# Patient Record
Sex: MALE | Race: OTHER | NOT HISPANIC OR LATINO
[De-identification: names, ages, dates, MRNs, and addresses within clinical notes are randomized per-mention and may not be internally consistent; named-entity substitution may affect disease eponyms.]

---

## 2020-07-15 ENCOUNTER — APPOINTMENT (OUTPATIENT)
Dept: ENDOCRINOLOGY | Facility: CLINIC | Age: 55
End: 2020-07-15
Payer: MEDICAID

## 2020-07-15 ENCOUNTER — APPOINTMENT (OUTPATIENT)
Dept: HEART AND VASCULAR | Facility: CLINIC | Age: 55
End: 2020-07-15
Payer: MEDICAID

## 2020-07-15 ENCOUNTER — APPOINTMENT (OUTPATIENT)
Dept: INTERNAL MEDICINE | Facility: CLINIC | Age: 55
End: 2020-07-15
Payer: MEDICAID

## 2020-07-15 ENCOUNTER — LABORATORY RESULT (OUTPATIENT)
Age: 55
End: 2020-07-15

## 2020-07-15 ENCOUNTER — NON-APPOINTMENT (OUTPATIENT)
Age: 55
End: 2020-07-15

## 2020-07-15 VITALS
SYSTOLIC BLOOD PRESSURE: 135 MMHG | WEIGHT: 183 LBS | DIASTOLIC BLOOD PRESSURE: 86 MMHG | BODY MASS INDEX: 27.11 KG/M2 | HEIGHT: 69 IN | HEART RATE: 76 BPM

## 2020-07-15 VITALS
HEIGHT: 69 IN | TEMPERATURE: 98.3 F | BODY MASS INDEX: 27.11 KG/M2 | WEIGHT: 183 LBS | SYSTOLIC BLOOD PRESSURE: 125 MMHG | HEART RATE: 68 BPM | DIASTOLIC BLOOD PRESSURE: 82 MMHG | OXYGEN SATURATION: 98 %

## 2020-07-15 VITALS
BODY MASS INDEX: 27.7 KG/M2 | SYSTOLIC BLOOD PRESSURE: 118 MMHG | HEART RATE: 55 BPM | DIASTOLIC BLOOD PRESSURE: 66 MMHG | WEIGHT: 187 LBS | HEIGHT: 69 IN

## 2020-07-15 VITALS — TEMPERATURE: 98.7 F

## 2020-07-15 DIAGNOSIS — R63.0 ANOREXIA: ICD-10-CM

## 2020-07-15 DIAGNOSIS — Z11.3 ENCOUNTER FOR SCREENING FOR INFECTIONS WITH A PREDOMINANTLY SEXUAL MODE OF TRANSMISSION: ICD-10-CM

## 2020-07-15 DIAGNOSIS — Z78.9 OTHER SPECIFIED HEALTH STATUS: ICD-10-CM

## 2020-07-15 DIAGNOSIS — Z86.79 PERSONAL HISTORY OF OTHER DISEASES OF THE CIRCULATORY SYSTEM: ICD-10-CM

## 2020-07-15 DIAGNOSIS — Q80.9 CONGENITAL ICHTHYOSIS, UNSPECIFIED: ICD-10-CM

## 2020-07-15 DIAGNOSIS — R06.00 DYSPNEA, UNSPECIFIED: ICD-10-CM

## 2020-07-15 DIAGNOSIS — Z80.0 FAMILY HISTORY OF MALIGNANT NEOPLASM OF DIGESTIVE ORGANS: ICD-10-CM

## 2020-07-15 DIAGNOSIS — I25.10 ATHEROSCLEROTIC HEART DISEASE OF NATIVE CORONARY ARTERY W/OUT ANGINA PECTORIS: ICD-10-CM

## 2020-07-15 DIAGNOSIS — Z23 ENCOUNTER FOR IMMUNIZATION: ICD-10-CM

## 2020-07-15 PROCEDURE — 90732 PPSV23 VACC 2 YRS+ SUBQ/IM: CPT

## 2020-07-15 PROCEDURE — 36415 COLL VENOUS BLD VENIPUNCTURE: CPT

## 2020-07-15 PROCEDURE — 99204 OFFICE O/P NEW MOD 45 MIN: CPT | Mod: 25

## 2020-07-15 PROCEDURE — 99386 PREV VISIT NEW AGE 40-64: CPT | Mod: 25

## 2020-07-15 PROCEDURE — 99205 OFFICE O/P NEW HI 60 MIN: CPT | Mod: 25

## 2020-07-15 PROCEDURE — G0009: CPT

## 2020-07-15 PROCEDURE — 93000 ELECTROCARDIOGRAM COMPLETE: CPT

## 2020-07-15 RX ORDER — TRAZODONE HYDROCHLORIDE 100 MG/1
100 TABLET ORAL
Refills: 0 | Status: DISCONTINUED | COMMUNITY
End: 2020-07-15

## 2020-07-15 RX ORDER — TAMSULOSIN HYDROCHLORIDE 0.4 MG/1
0.4 CAPSULE ORAL
Refills: 0 | Status: DISCONTINUED | COMMUNITY
End: 2020-07-15

## 2020-07-15 RX ORDER — ASPIRIN 81 MG
81 TABLET, DELAYED RELEASE (ENTERIC COATED) ORAL
Refills: 0 | Status: ACTIVE | COMMUNITY

## 2020-07-15 RX ORDER — AMMONIUM LACTATE 12 %
12 CREAM (GRAM) TOPICAL TWICE DAILY
Qty: 1 | Refills: 3 | Status: ACTIVE | COMMUNITY
Start: 2020-07-15 | End: 1900-01-01

## 2020-07-15 NOTE — HEALTH RISK ASSESSMENT
[No falls in past year] : Patient reported no falls in the past year [No] : In the past 12 months have you used drugs other than those required for medical reasons? No [0] : 2) Feeling down, depressed, or hopeless: Not at all (0) [Employed] : employed [] :  [Fully functional (bathing, dressing, toileting, transferring, walking, feeding)] : Fully functional (bathing, dressing, toileting, transferring, walking, feeding) [Fully functional (using the telephone, shopping, preparing meals, housekeeping, doing laundry, using] : Fully functional and needs no help or supervision to perform IADLs (using the telephone, shopping, preparing meals, housekeeping, doing laundry, using transportation, managing medications and managing finances) [With Patient/Caregiver] : With Patient/Caregiver [] : No [[id number]] : 0 [Change in mental status noted] : No change in mental status noted [Language] : denies difficulty with language [Handling Complex Tasks] : denies difficulty handling complex tasks [AdvancecareDate] : 07/15/2020

## 2020-07-15 NOTE — REVIEW OF SYSTEMS
[Chest Pain] : chest pain [Fever] : no fever [Blurry Vision] : no blurred vision [Chills] : no chills [Mouth Sores] : no mouth sores [Shortness Of Breath] : no shortness of breath [Dyspnea on exertion] : not dyspnea during exertion [Lower Ext Edema] : no extremity edema [Abdominal Pain] : no abdominal pain [Cough] : no cough [Palpitations] : no palpitations [Heartburn] : no heartburn [Urinary Frequency] : no change in urinary frequency [Skin: A Rash] : no rash: [Joint Pain] : no joint pain [Dizziness] : no dizziness [Confusion] : no confusion was observed [Easy Bleeding] : no tendency for easy bleeding

## 2020-07-15 NOTE — HISTORY OF PRESENT ILLNESS
[FreeTextEntry1] : annual exam/est care. [de-identified] : decreased appetite\par - present for past few months\par - a/w globus senstation\par - has never had EGD\par \par Nephrolithiaiss\par - recurrent, in the past has been treated by urology w/ cystoscopy\par - most recently pain started 3 weeks ago, was seen at 2 different EDs\par - as per daughter CT scan showed small stones b/l\par - was d/c'ed after IV hydration w/ tamsulosin and outpt follow up\par \par dry skin\par - from work as \par - has tried numerous moisturizers and \par \par insomnia\par - normally goes to bed at 6-7pm wakes up at 5am\par - has trouble staying asleep\par - uses advil PM to sleep most nights\par \par DM2, uncontrolled\par -  today\par - was seen by endo\par - states compliance w/ metformin\par \par HCM\par - needs colonoscopy, states 2 brothers passed away from GI/colon cancers

## 2020-07-15 NOTE — PHYSICAL EXAM
[General Appearance - Well Developed] : well developed [General Appearance - Well Nourished] : well nourished [Normal Conjunctiva] : the conjunctiva exhibited no abnormalities [Normal Oropharynx] : normal oropharynx [Normal Jugular Venous V Waves Present] : normal jugular venous V waves present [Respiration, Rhythm And Depth] : normal respiratory rhythm and effort [Bowel Sounds] : normal bowel sounds [Abdomen Tenderness] : non-tender [Abdomen Soft] : soft [Abnormal Walk] : normal gait [Nail Clubbing] : no clubbing of the fingernails [] : no hepato-splenomegaly [Skin Color & Pigmentation] : normal skin color and pigmentation

## 2020-07-15 NOTE — REASON FOR VISIT
[Initial Evaluation] : an initial evaluation [DM Type 2] : DM Type 2 [Family Member] : family member

## 2020-07-15 NOTE — HISTORY OF PRESENT ILLNESS
[FreeTextEntry1] : Patient comes for evaluation of occasional CP, unrelated to exertion, short lasting resolved spontaneously,.\par He states that he had 2 MI in the past, none of them was w/u'ed and there was no hospitalization. \par Mild FANG, no SOB, no palpitations, no leg swelling.

## 2020-07-16 NOTE — PHYSICAL EXAM
[Alert] : alert [Normal Sclera/Conjunctiva] : normal sclera/conjunctiva [Normal Outer Ear/Nose] : the ears and nose were normal in appearance [No Neck Mass] : no neck mass was observed [Normal Rate] : heart rate was normal [Regular Rhythm] : with a regular rhythm [No Respiratory Distress] : no respiratory distress [Normal Bowel Sounds] : normal bowel sounds [No Edema] : no peripheral edema [Spine Straight] : spine straight [No Stigmata of Cushings Syndrome] : no stigmata of Cushings Syndrome [Normal Gait] : normal gait [No Rash] : no rash [Right Foot Was Examined] : right foot ~C was examined [Left Foot Was Examined] : left foot ~C was examined [Normal] : normal [Normal Reflexes] : deep tendon reflexes were 2+ and symmetric [Oriented x3] : oriented to person, place, and time [Vibration Dec.] : normal vibratory sensation at the level of the toes [FreeTextEntry2] : fungal nails [FreeTextEntry6] : fungal nails

## 2020-07-16 NOTE — REVIEW OF SYSTEMS
[Fatigue] : fatigue [Blurred Vision] : blurred vision [Nocturia] : nocturia [Recent Weight Loss (___ Lbs)] : recent weight loss: [unfilled] lbs [Negative] : Heme/Lymph [de-identified] : LE and UE stiffness and numbness

## 2020-07-16 NOTE — HISTORY OF PRESENT ILLNESS
[FreeTextEntry1] : 53 y/o M pt, with Hx of T2DM (dx ~10 years ago), presents today to establish endocrine care with me.  Pt is not aware of any  DM related complications,\par Other PMHx: Kidney stone. 2x Cardiac ischemia (many years ago) \par FHx: DM(father, 5 siblings), CA, CAD\par SHx: Non smoker. Occasional EtOH use. Works as a . \par Lifestyle: Physically active; walks everyday.  Eats 3 meals a day with snacks in between. Checks BS sporadically.\par Last Funduscopic visit: ~15 yrs ago\par Last RD visit: long time ago per pt. \par Has never followed with podiatrist. \par \par 07/15/2020\par Pt's daughter was concerned about his health and brought him to see an endocrinologist for his DM management. Pt is worried because his father had to undergo LE amputation due to DM complications. \par \par Pt presents today, feeling tired all the time with c/o of weight loss. He has reportedly lost at least 20 lbs over past 2 years and continues to lose weight. Pt reports of nocturia 4-5 times a day and blurry vision. He experiences LE and UE stiffness and numbness.  \par \par Current Medications: Metformin 750mg, Trazodone 100mg, Aspirin 81mg, Tamsulosin 0.4mg, Lancet

## 2020-07-16 NOTE — ASSESSMENT
[Diabetes Foot Care] : diabetes foot care [Importance of Diet and Exercise] : importance of diet and exercise to improve glycemic control, achieve weight loss and improve cardiovascular health [Self Monitoring of Blood Glucose] : self monitoring of blood glucose [FreeTextEntry1] : 53 y/o M pt with:\par \par 1. Hx of DM (dx ~10 yrs ago), poorly control. Patient is not aware of having diabetes complications:\par Assess for micro and macrovascular complications\par Diabetes treatment goals discussed, including target goals for BP, LDL-c, A1c, and body weight. \par Discussed the importance lifestyle diet modification and glucose monitoring, along with targets for pre and post prandial BS.  \par Briefly summarized anti-diabetic medications, including GIE3avo, SGLT2 benefits and side effects along with basal and prandial insulin \par Recommend pt continue present DM management until the assessment of current evaluation is completed. \par Refer pt to see RD and nurse educator for comprehensive DM teachings, including sick days management. \par Will order labs today, including metabolic, lipid profiles, folate and B12. \par \par Return in 1 week

## 2020-07-16 NOTE — ADDENDUM
[FreeTextEntry1] : I, Aliya Sullivan, acted solely as a scribe for Dr. Pardeep Conner on this date. 07/15/2020.

## 2020-07-16 NOTE — END OF VISIT
[FreeTextEntry3] : All medical record entries made by the Scribe were at my, Dr. Pardeep Conner, direction and personally dictated by me on 07/15/2020. I have reviewed the chart and agree that the record accurately reflects my personal performance of the history, physical exam, assessment and plan. I have also personally directed, reviewed and agreed with the chart.  [Time Spent: ___ minutes] : I have spent [unfilled] minutes of time on the encounter. [>50% of the face to face encounter time was spent on counseling and/or coordination of care for ___] : Greater than 50% of the face to face encounter time was spent on counseling and/or coordination of care for [unfilled]

## 2020-07-17 ENCOUNTER — APPOINTMENT (OUTPATIENT)
Dept: ENDOCRINOLOGY | Facility: CLINIC | Age: 55
End: 2020-07-17
Payer: MEDICAID

## 2020-07-17 PROCEDURE — 99214 OFFICE O/P EST MOD 30 MIN: CPT | Mod: 95

## 2020-07-17 RX ORDER — METFORMIN ER 750 MG 750 MG/1
750 TABLET ORAL
Refills: 0 | Status: DISCONTINUED | COMMUNITY
End: 2020-07-17

## 2020-07-18 LAB
ESTIMATED AVERAGE GLUCOSE: 309 MG/DL
HBA1C MFR BLD HPLC: 12.4 %

## 2020-07-18 NOTE — ASSESSMENT
[Importance of Diet and Exercise] : importance of diet and exercise to improve glycemic control, achieve weight loss and improve cardiovascular health [FreeTextEntry1] : 53 y/o M pt with:\par \par 1. Hx of T2DM, poorly controlled, with DM related complications of retinopathy,proteinuria, and ASCVD\par Will continue assessing for DM complications.\par Pt is on glucose toxicity with A1c of 12.4%. He declines initiation of insulin. Pt will follow low calorie diet, and initiate combined DM treatment, Janumet  BID. \par Advised pt to monitor pre and postprandial BS.\par Follow with RD next week.\par \par 2. Hx of Hyperlipidemia; recent  and LDL-c 165 on 7/15/20:\par For patients with DM and  ASCVD is at increase risk for cardiac events.\par Recommended to use high intensity statins therapy. In addition, moderate intensity statin therapy.\par Recommend lifestyle intervention such as weight loss, increased physical therapy, medical nutrition therapy which will allow patients to reduce ASCVD risk factors. \par Need for CAD stratifications.\par Will initiate pt on Atorvastatin 20 mg,and increase to 80 mg. \par Appt with RN\par Return in: 3 months.  [Self Monitoring of Blood Glucose] : self monitoring of blood glucose

## 2020-07-18 NOTE — HISTORY OF PRESENT ILLNESS
[Home] : at home, [unfilled] , at the time of the visit. [Medical Office: ([facility])___] : at the medical office located in  [Verbal consent obtained from patient] : the patient, [unfilled] [FreeTextEntry1] : 53 y/o M pt, with Hx of T2DM (dx ~10 years ago) with DM related complications of retinopathy, proteinuria, CAD\par Other PMHx: Kidney stone. 2x, "Cardiac ischemia" (many years ago) \par FHx: DM(father, 5 siblings), CA, CAD\par Lifestyle: Physically active; walks everyday. Eats 3 meals a day with snacks in between. Checks BS sporadically.\par Last Funduscopic visit: ~15 yrs ago\par Last RD visit: long time ago per pt. \par Has never followed with podiatrist. \par \par 07/15/2020\par Pt's daughter was concerned about his health and brought him to see an endocrinologist for his DM management. Pt is worried because his father had to undergo LE amputation due to DM complications. \par Pt presents today, feeling tired all the time with c/o of weight loss. He has reportedly lost at least 20 lbs over past 2 years and continues to lose weight. Pt reports of nocturia 4-5 times a day and blurry vision. He experiences LE and UE stiffness and numbness. \par \par 07/17/2020 \par Pt did not have any questions prior to the initiation of the telehealth visit.\par Pt presents today with his daughter via telehealth, clinically unchanged since his last visit. He continues to experience tiredness and excessive urination. He does not check BS. He has not modified his diet and continues to eat large food portions. \par He goes to work 5x7 days\par Current Medications: Metformin ER 750mg, Trazodone 100mg, Aspirin 81mg, Tamsulosin 0.4mg, Lancet \par \par Most recent lab studies:\par - 7/16/20: A1c 12.4%\par - 7/15/20: s.creat 0.69, , Cholesterol 238 (H), LDL-c 165 (H), TSH 1.07

## 2020-07-18 NOTE — REVIEW OF SYSTEMS
[Fatigue] : fatigue [Recent Weight Loss (___ Lbs)] : recent weight loss: [unfilled] lbs [Nocturia] : nocturia [Blurred Vision] : blurred vision [Negative] : Endocrine [de-identified] : LE and UE stiffness and numbness

## 2020-07-18 NOTE — ADDENDUM
[FreeTextEntry1] : I, Aliya Sullivan, acted solely as a scribe for Dr. Pardeep Conner on this date. 07/17/2020.

## 2020-07-18 NOTE — END OF VISIT
[FreeTextEntry3] : All medical record entries made by the Scribe were at my, Dr. Pardeep Conner, direction and personally dictated by me on 07/17/2020. I have reviewed the chart and agree that the record accurately reflects my personal performance of the history, physical exam, assessment and plan. I have also personally directed, reviewed and agreed with the chart.  [Time Spent: ___ minutes] : I have spent [unfilled] minutes of time on the encounter. [>50% of the face to face encounter time was spent on counseling and/or coordination of care for ___] : Greater than 50% of the face to face encounter time was spent on counseling and/or coordination of care for [unfilled]

## 2020-07-20 RX ORDER — SITAGLIPTIN AND METFORMIN HYDROCHLORIDE 50; 1000 MG/1; MG/1
50-1000 TABLET, FILM COATED ORAL
Qty: 90 | Refills: 3 | Status: DISCONTINUED | COMMUNITY
Start: 2020-07-17 | End: 2020-07-20

## 2020-07-22 DIAGNOSIS — Z11.59 ENCOUNTER FOR SCREENING FOR OTHER VIRAL DISEASES: ICD-10-CM

## 2020-07-22 LAB
25(OH)D3 SERPL-MCNC: 21 NG/ML
APPEARANCE: CLEAR
BILIRUBIN URINE: NEGATIVE
BLOOD URINE: NEGATIVE
C TRACH RRNA SPEC QL NAA+PROBE: NOT DETECTED
COLOR: NORMAL
FSH SERPL-MCNC: 4.8 IU/L
GLUCOSE QUALITATIVE U: ABNORMAL
HCV AB SER QL: NONREACTIVE
HCV S/CO RATIO: 0.13 S/CO
HIV1+2 AB SPEC QL IA.RAPID: NONREACTIVE
KETONES URINE: NEGATIVE
LEUKOCYTE ESTERASE URINE: NEGATIVE
LH SERPL-ACNC: 7.1 IU/L
MAGNESIUM SERPL-MCNC: 1.7 MG/DL
N GONORRHOEA RRNA SPEC QL NAA+PROBE: NOT DETECTED
NITRITE URINE: NEGATIVE
PH URINE: 5.5
PHOSPHATE SERPL-MCNC: 2.6 MG/DL
PROTEIN URINE: NEGATIVE
PSA SERPL-MCNC: 0.6 NG/ML
SOURCE AMPLIFICATION: NORMAL
SPECIFIC GRAVITY URINE: 1.04
T PALLIDUM AB SER QL IA: NEGATIVE
TESTOST SERPL-MCNC: 339 NG/DL
URATE SERPL-MCNC: 3.7 MG/DL
UROBILINOGEN URINE: NORMAL

## 2020-07-22 RX ORDER — ERGOCALCIFEROL 1.25 MG/1
1.25 MG CAPSULE, LIQUID FILLED ORAL
Qty: 12 | Refills: 3 | Status: ACTIVE | COMMUNITY
Start: 2020-07-22 | End: 1900-01-01

## 2020-07-24 ENCOUNTER — APPOINTMENT (OUTPATIENT)
Dept: ENDOCRINOLOGY | Facility: CLINIC | Age: 55
End: 2020-07-24
Payer: MEDICAID

## 2020-07-24 ENCOUNTER — RESULT CHARGE (OUTPATIENT)
Age: 55
End: 2020-07-24

## 2020-07-24 VITALS
HEART RATE: 93 BPM | DIASTOLIC BLOOD PRESSURE: 75 MMHG | WEIGHT: 182 LBS | SYSTOLIC BLOOD PRESSURE: 118 MMHG | BODY MASS INDEX: 26.88 KG/M2

## 2020-07-24 LAB — GLUCOSE BLDC GLUCOMTR-MCNC: 342

## 2020-07-24 PROCEDURE — 99211 OFF/OP EST MAY X REQ PHY/QHP: CPT | Mod: 25

## 2020-07-24 PROCEDURE — 97802 MEDICAL NUTRITION INDIV IN: CPT

## 2020-07-24 PROCEDURE — 82962 GLUCOSE BLOOD TEST: CPT

## 2020-08-03 LAB
ALBUMIN SERPL ELPH-MCNC: 4.7 G/DL
ALP BLD-CCNC: 74 U/L
ALT SERPL-CCNC: 49 U/L
ANION GAP SERPL CALC-SCNC: 15 MMOL/L
AST SERPL-CCNC: 26 U/L
BILIRUB SERPL-MCNC: 0.2 MG/DL
BUN SERPL-MCNC: 13 MG/DL
CALCIUM SERPL-MCNC: 9.6 MG/DL
CHLORIDE SERPL-SCNC: 98 MMOL/L
CHOLEST SERPL-MCNC: 238 MG/DL
CHOLEST/HDLC SERPL: 5.3 RATIO
CO2 SERPL-SCNC: 25 MMOL/L
CREAT SERPL-MCNC: 0.69 MG/DL
CREAT SPEC-SCNC: 57 MG/DL
FOLATE SERPL-MCNC: 17.1 NG/ML
GLUCOSE BLDC GLUCOMTR-MCNC: 333
GLUCOSE SERPL-MCNC: 374 MG/DL
HDLC SERPL-MCNC: 45 MG/DL
LDLC SERPL CALC-MCNC: 165 MG/DL
MICROALBUMIN 24H UR DL<=1MG/L-MCNC: <1.2 MG/DL
MICROALBUMIN/CREAT 24H UR-RTO: NORMAL MG/G
POTASSIUM SERPL-SCNC: 5.4 MMOL/L
PROT SERPL-MCNC: 7.2 G/DL
SARS-COV-2 IGG SERPL IA-ACNC: <0.1 INDEX
SARS-COV-2 IGG SERPL QL IA: NEGATIVE
SODIUM SERPL-SCNC: 138 MMOL/L
TRIGL SERPL-MCNC: 141 MG/DL
TSH SERPL-ACNC: 1.07 UIU/ML
VIT B12 SERPL-MCNC: 428 PG/ML

## 2020-08-10 ENCOUNTER — TRANSCRIPTION ENCOUNTER (OUTPATIENT)
Age: 55
End: 2020-08-10

## 2020-08-11 ENCOUNTER — NON-APPOINTMENT (OUTPATIENT)
Age: 55
End: 2020-08-11

## 2020-08-11 ENCOUNTER — APPOINTMENT (OUTPATIENT)
Age: 55
End: 2020-08-11
Payer: MEDICAID

## 2020-08-11 ENCOUNTER — APPOINTMENT (OUTPATIENT)
Dept: ENDOCRINOLOGY | Facility: CLINIC | Age: 55
End: 2020-08-11
Payer: MEDICAID

## 2020-08-11 VITALS
HEART RATE: 84 BPM | OXYGEN SATURATION: 97 % | RESPIRATION RATE: 14 BRPM | DIASTOLIC BLOOD PRESSURE: 82 MMHG | BODY MASS INDEX: 28.88 KG/M2 | WEIGHT: 195 LBS | SYSTOLIC BLOOD PRESSURE: 122 MMHG | HEIGHT: 69 IN

## 2020-08-11 VITALS
SYSTOLIC BLOOD PRESSURE: 113 MMHG | HEART RATE: 80 BPM | BODY MASS INDEX: 26.58 KG/M2 | DIASTOLIC BLOOD PRESSURE: 75 MMHG | WEIGHT: 180 LBS

## 2020-08-11 DIAGNOSIS — Z12.11 ENCOUNTER FOR SCREENING FOR MALIGNANT NEOPLASM OF COLON: ICD-10-CM

## 2020-08-11 PROCEDURE — 99203 OFFICE O/P NEW LOW 30 MIN: CPT

## 2020-08-11 PROCEDURE — 92004 COMPRE OPH EXAM NEW PT 1/>: CPT

## 2020-08-11 PROCEDURE — 99214 OFFICE O/P EST MOD 30 MIN: CPT | Mod: 25

## 2020-08-11 PROCEDURE — 92134 CPTRZ OPH DX IMG PST SGM RTA: CPT

## 2020-08-11 PROCEDURE — 82962 GLUCOSE BLOOD TEST: CPT

## 2020-08-11 NOTE — REVIEW OF SYSTEMS
[Decreased Appetite] : decreased appetite [Fatigue] : fatigue [Depression] : depression [Stress] : stress [Negative] : Heme/Lymph

## 2020-08-12 PROBLEM — Z12.11 COLON CANCER SCREENING: Status: ACTIVE | Noted: 2020-08-12

## 2020-08-12 NOTE — ASSESSMENT
[FreeTextEntry1] : 55 Bulgarian speaking male with PMH on HTN, DM and HLP is here with his daughter for evaluation for screening colonoscopy. He never had a colonoscopy before. Reports normal BM, denies hematochezia/melena, abdominal pain. Weight is stable. FH of colon in FDR however all>50 years at diagnosis\par \par - Book for screening colonoscopy with prior COVID testing\par - risk of b/p/i discussed\par

## 2020-08-12 NOTE — HISTORY OF PRESENT ILLNESS
[de-identified] : 55 Faroese speaking male with PMH on HTN, DM and HLP is here with his daughter (who is also the HCP) for evaluation for screening colonoscopy. He never had a colonoscopy before. Reports normal BM, denies hematochezia/melena, abdominal pain. Weight is stable.\par \par FH: Colon Ca in two brothers (in 70s and 80s), sister (stomach ca at 60 yrs), sister (liver ca)\par SH: never smoker; occasional etoh\par \par

## 2020-08-12 NOTE — PHYSICAL EXAM
[General Appearance - In No Acute Distress] : in no acute distress [General Appearance - Alert] : alert [Sclera] : the sclera and conjunctiva were normal [PERRL With Normal Accommodation] : pupils were equal in size, round, and reactive to light [Hearing Threshold Finger Rub Not [place]] : hearing was normal [Neck Appearance] : the appearance of the neck was normal [Respiration, Rhythm And Depth] : normal respiratory rhythm and effort [Heart Rate And Rhythm] : heart rate was normal and rhythm regular [Exaggerated Use Of Accessory Muscles For Inspiration] : no accessory muscle use [Edema] : there was no peripheral edema [Heart Sounds] : normal S1 and S2 [No CVA Tenderness] : no ~M costovertebral angle tenderness [Abdomen Tenderness] : non-tender [Abdomen Soft] : soft [Musculoskeletal - Swelling] : no joint swelling seen [No Spinal Tenderness] : no spinal tenderness [Abnormal Walk] : normal gait [] : no rash [No Focal Deficits] : no focal deficits [Affect] : the affect was normal [Oriented To Time, Place, And Person] : oriented to person, place, and time [FreeTextEntry1] : centripetal obesity

## 2020-08-13 LAB — GLUCOSE BLDC GLUCOMTR-MCNC: 178

## 2020-08-13 NOTE — PHYSICAL EXAM
[Alert] : alert [Normal Outer Ear/Nose] : the ears and nose were normal in appearance [Normal Sclera/Conjunctiva] : normal sclera/conjunctiva [No Respiratory Distress] : no respiratory distress [No Neck Mass] : no neck mass was observed [No Edema] : no peripheral edema [Normal Rate] : heart rate was normal [Regular Rhythm] : with a regular rhythm [Normal Bowel Sounds] : normal bowel sounds [Spine Straight] : spine straight [No Stigmata of Cushings Syndrome] : no stigmata of Cushings Syndrome [Normal Gait] : normal gait [No Rash] : no rash [Right Foot Was Examined] : right foot ~C was examined [Left Foot Was Examined] : left foot ~C was examined [Normal] : normal [Normal Reflexes] : deep tendon reflexes were 2+ and symmetric [Oriented x3] : oriented to person, place, and time [FreeTextEntry2] : fungal nails [Vibration Dec.] : normal vibratory sensation at the level of the toes [FreeTextEntry6] : fungal nails

## 2020-08-13 NOTE — END OF VISIT
[FreeTextEntry3] : All medical record entries made by the Scribe were at my, Dr. Pardeep Conner, direction and personally dictated by me on 08/11/2020. I have reviewed the chart and agree that the record accurately reflects my personal performance of the history, physical exam, assessment and plan. I have also personally directed, reviewed and agreed with the chart.  [Time Spent: ___ minutes] : I have spent [unfilled] minutes of time on the encounter. [>50% of the face to face encounter time was spent on counseling and/or coordination of care for ___] : Greater than 50% of the face to face encounter time was spent on counseling and/or coordination of care for [unfilled]

## 2020-08-13 NOTE — ADDENDUM
[FreeTextEntry1] : I, Aliya Slulivan, acted solely as a scribe for Dr. Pardeep Conner on this date. 08/11/2020.

## 2020-08-13 NOTE — HISTORY OF PRESENT ILLNESS
[FreeTextEntry1] : 53 y/o M pt, with Hx of T2DM (dx ~10 years ago) with DM related complications of retinopathy, proteinuria and CAD.\par Other PMHx: Kidney stone. 2x, "Cardiac ischemia" (many years ago) \par FHx: DM(father, 5 siblings), CA, CAD\par Lifestyle: Physically active; walks everyday. Eats 3 meals a day with snacks in between. Checks BS sporadically.\par Last Funduscopic visit: recently; pt was informed of having DM retinopathy. \par Last RD visit: long time ago per pt. \par Has never followed with podiatrist. \par \par 07/15/2020\par Pt's daughter was concerned about his health and brought him to see an endocrinologist for his DM management. Pt is worried because his father had to undergo LE amputation due to DM complications. \par Pt presents today, feeling tired all the time with c/o of weight loss. He has reportedly lost at least 20 lbs over past 2 years and continues to lose weight. Pt reports of nocturia 4-5 times a day and blurry vision. He experiences LE and UE stiffness and numbness. \par \par 07/17/2020 \par Pt did not have any questions prior to the initiation of the telehealth visit.\par Pt presents today with his daughter via telehealth, clinically unchanged since his last visit. He continues to experience tiredness and excessive urination. He does not check BS. He has not modified his diet and continues to eat large food portions. \par He goes to work 5x7 days\par \par 08/11/2020 \par Pt presents today with his daughter with POCT 178 for DM f/u, feeling more tired than usual "probably due to heat", stressed and depressed. Pt reports decrease in appetite since few days ago. His BS is reportedly in 200s. He sleeps 5 hours a day. He saw ophthalmologist recently who informed him of having DM retinopathy. He is scheduled for colonoscopy in 7/2020. \par \par Current Medications: Al[place]-Metformin 12.5-1000mg, Metformin ER 750mg, Trazodone 100mg, Aspirin 81mg, Tamsulosin 0.4mg, Lancet, Atorvastatin 20mg (initiated last visit 7/2020)\par \par Most recent lab studies:\par - 7/16/20: A1c 12.4%\par - 7/15/20: s.creat 0.69, , Cholesterol 238 (H), LDL-c 165 (H), TSH 1.07

## 2020-08-13 NOTE — ASSESSMENT
[Carbohydrate Consistent Diet] : carbohydrate consistent diet [Importance of Diet and Exercise] : importance of diet and exercise to improve glycemic control, achieve weight loss and improve cardiovascular health [FreeTextEntry1] : 56 y/o M pt with:\par \par 1. Hx of T2DM, poorly controlled, with DM related complications of retinopathy, proteinuria, and ASCVD\par His daughter is collaborating on his DM treatment with him. As a result, his glycemic control has improved. \par Add Glimepiride 2mg to Alogliptin. \par \par 2. Hx of Hyperlipidemia; recent  and LDL-c 165 on 7/15/20:\par For patients with DM and ASCVD is at increase risk for cardiac events.\par Recommended to use high intensity statins therapy. In addition, moderate intensity statin therapy.\par Pt saw his cardiologist for ASCVD stratification. \par \par Return in: 3 months

## 2020-09-04 DIAGNOSIS — J20.9 ACUTE BRONCHITIS, UNSPECIFIED: ICD-10-CM

## 2020-10-09 ENCOUNTER — APPOINTMENT (OUTPATIENT)
Dept: NEPHROLOGY | Facility: CLINIC | Age: 55
End: 2020-10-09

## 2020-11-16 ENCOUNTER — APPOINTMENT (OUTPATIENT)
Dept: ENDOCRINOLOGY | Facility: CLINIC | Age: 55
End: 2020-11-16
Payer: MEDICAID

## 2020-11-16 ENCOUNTER — APPOINTMENT (OUTPATIENT)
Dept: ENDOCRINOLOGY | Facility: CLINIC | Age: 55
End: 2020-11-16
Payer: COMMERCIAL

## 2020-11-16 ENCOUNTER — APPOINTMENT (OUTPATIENT)
Dept: INTERNAL MEDICINE | Facility: CLINIC | Age: 55
End: 2020-11-16
Payer: MEDICAID

## 2020-11-16 ENCOUNTER — LABORATORY RESULT (OUTPATIENT)
Age: 55
End: 2020-11-16

## 2020-11-16 VITALS
HEIGHT: 69 IN | HEART RATE: 93 BPM | WEIGHT: 185 LBS | BODY MASS INDEX: 27.4 KG/M2 | TEMPERATURE: 98.8 F | OXYGEN SATURATION: 98 % | DIASTOLIC BLOOD PRESSURE: 80 MMHG | SYSTOLIC BLOOD PRESSURE: 121 MMHG

## 2020-11-16 VITALS
BODY MASS INDEX: 27.32 KG/M2 | DIASTOLIC BLOOD PRESSURE: 88 MMHG | HEART RATE: 93 BPM | SYSTOLIC BLOOD PRESSURE: 137 MMHG | WEIGHT: 185 LBS

## 2020-11-16 DIAGNOSIS — N52.9 MALE ERECTILE DYSFUNCTION, UNSPECIFIED: ICD-10-CM

## 2020-11-16 DIAGNOSIS — Z23 ENCOUNTER FOR IMMUNIZATION: ICD-10-CM

## 2020-11-16 DIAGNOSIS — R30.0 DYSURIA: ICD-10-CM

## 2020-11-16 PROCEDURE — 99215 OFFICE O/P EST HI 40 MIN: CPT | Mod: 25

## 2020-11-16 PROCEDURE — 99072 ADDL SUPL MATRL&STAF TM PHE: CPT

## 2020-11-16 PROCEDURE — 90686 IIV4 VACC NO PRSV 0.5 ML IM: CPT

## 2020-11-16 PROCEDURE — 82962 GLUCOSE BLOOD TEST: CPT

## 2020-11-16 PROCEDURE — G0008: CPT

## 2020-11-16 PROCEDURE — 99213 OFFICE O/P EST LOW 20 MIN: CPT | Mod: 25

## 2020-11-16 PROCEDURE — 36415 COLL VENOUS BLD VENIPUNCTURE: CPT

## 2020-11-16 PROCEDURE — 97803 MED NUTRITION INDIV SUBSEQ: CPT

## 2020-11-16 RX ORDER — SILDENAFIL 100 MG/1
100 TABLET, FILM COATED ORAL
Qty: 10 | Refills: 11 | Status: ACTIVE | COMMUNITY
Start: 2020-11-16 | End: 1900-01-01

## 2020-11-16 NOTE — ASSESSMENT
[Carbohydrate Consistent Diet] : carbohydrate consistent diet [Importance of Diet and Exercise] : importance of diet and exercise to improve glycemic control, achieve weight loss and improve cardiovascular health [Self Monitoring of Blood Glucose] : self monitoring of blood glucose [FreeTextEntry1] : 56 y/o M pt with:\par \par 1. Hx of T2DM, poorly controlled, with DM related complications of retinopathy, proteinuria, and ASCVD\par At his initial visit A1c 12.4%, and today POCT: A1c is 8%. \par Diabetes treatment goals discussed to pt and his daughter. Pt is requesting for refills for 3 months since he is going away and he will call us later with a list of medications he is on. \par \par 2. Hx of Cardiac Ischemia (as per pt):\par He saw the cardiologist in 7/2020, work up in progress. \par \par 3. Hyperlipidemia. Explained the need to restrict fat consumptions and the importance of being consistent with his stains. Set LDL-c less than 50. Recommend f/u with cardiologist. \par \par Return in: 3 months

## 2020-11-16 NOTE — HISTORY OF PRESENT ILLNESS
[FreeTextEntry1] : follow up [de-identified] : doing well over all\par - would like to go to DR for at least 3 months\par \par DM2\par - compliant w/ medications\par \par ED\par - has tried viagra in the past w/ good results\par - would like refill\par \par dysuria \par - for the past 2 weeks c/o burning while urinating\par \par HCM\par - needs flu vax

## 2020-11-16 NOTE — ADDENDUM
[FreeTextEntry1] : I, [place]ber [place], acted solely as a scribe for Dr. Pardeep Conner on this date. 11/16/2020.

## 2020-11-16 NOTE — END OF VISIT
[FreeTextEntry3] : All medical record entries made by the Scribe were at my, Dr. Pardeep Conner, direction and personally dictated by me on 11/16/2020. I have reviewed the chart and agree that the record accurately reflects my personal performance of the history, physical exam, assessment and plan. I have also personally directed, reviewed and agreed with the chart.  [Time Spent: ___ minutes] : I have spent [unfilled] minutes of time on the encounter. [>50% of the face to face encounter time was spent on counseling and/or coordination of care for ___] : Greater than 50% of the face to face encounter time was spent on counseling and/or coordination of care for [unfilled]

## 2020-11-16 NOTE — REVIEW OF SYSTEMS
[Stress] : stress [Negative] : Neurological [Recent Weight Gain (___ Lbs)] : recent weight gain: [unfilled] lbs [As Noted in HPI] : as noted in HPI

## 2020-11-16 NOTE — PHYSICAL EXAM
[Alert] : alert [Normal Sclera/Conjunctiva] : normal sclera/conjunctiva [Normal Outer Ear/Nose] : the ears and nose were normal in appearance [No Neck Mass] : no neck mass was observed [No Respiratory Distress] : no respiratory distress [Normal Rate] : heart rate was normal [Regular Rhythm] : with a regular rhythm [No Edema] : no peripheral edema [Normal Bowel Sounds] : normal bowel sounds [Spine Straight] : spine straight [No Stigmata of Cushings Syndrome] : no stigmata of Cushings Syndrome [Normal Gait] : normal gait [No Rash] : no rash [Right Foot Was Examined] : right foot ~C was examined [Left Foot Was Examined] : left foot ~C was examined [Normal] : normal [Normal Reflexes] : deep tendon reflexes were 2+ and symmetric [Oriented x3] : oriented to person, place, and time [Vibration Dec.] : normal vibratory sensation at the level of the toes [FreeTextEntry2] : fungal nails [FreeTextEntry6] : fungal nails

## 2020-11-16 NOTE — HISTORY OF PRESENT ILLNESS
[FreeTextEntry1] : 53 y/o M pt, /88, BMI 27.32,with Hx of T2DM (dx ~10 years ago) with DM related complications of retinopathy, proteinuria and CAD.\par Other PMHx: Kidney stone. 2x, "Cardiac ischemia" (many years ago) \par FHx: DM(father, 5 siblings), CA, CAD\par Lifestyle: Physically active; walks everyday. Eats 3 meals a day with snacks in between. Checks BS sporadically.\par Last Funduscopic visit: 9/2020 \par Last RD visit: long time ago per pt. \par Has never followed with podiatrist. \par \par 07/15/2020\par Pt's daughter was concerned about his health and brought him to see an endocrinologist for his DM management. Pt is worried because his father had to undergo LE amputation due to DM complications. \par Pt presents today, feeling tired all the time with c/o of weight loss. He has reportedly lost at least 20 lbs over past 2 years and continues to lose weight. Pt reports of nocturia 4-5 times a day and blurry vision. He experiences LE and UE stiffness and numbness. \par \par 07/17/2020 \par Pt did not have any questions prior to the initiation of the telehealth visit.\par Pt presents today with his daughter via telehealth, clinically unchanged since his last visit. He continues to experience tiredness and excessive urination. He does not check BS. He has not modified his diet and continues to eat large food portions. \par He goes to work 5x7 days\par \par 08/11/2020 \par Pt presents today with his daughter with POCT 178 for DM f/u, feeling more tired than usual "probably due to heat", stressed and depressed. Pt reports decrease in appetite since few days ago. His BS is reportedly in 200s. He sleeps 5 hours a day. He saw ophthalmologist recently who informed him of having DM retinopathy. He is scheduled for colonoscopy in 7/2020. \par \par 11/16/20\par Today pt presents for DM f/u with POCT 109, feeling well. He states he noticed that his symptoms has improved, he is less tired and more energized. However, pt states he is unable to gain weight. Pt notes he is watching his diet and eating scheduled meals. \par Pt notes BS readings of 200 and postprandial BS readings of 150. \par Pt gained 5lbs since 8/2020. \par \par Pt did not bring his medications. \par \par Current Medications: Glimepiride 2mg QD, Aloglipton- Metformin 12.5-1000mg, Trazodone 100mg, Aspirin 81mg, Tamsulosin 0.4mg, Lancet, Atorvastatin 20mg (initiated in 7/2020)\par \par Most recent lab studies:\par - 11/16/20 POCT A1c 8.0%\par - 7/16/20: A1c 12.4%\par - 7/15/20: s.creat 0.69, , Cholesterol 238 (H), LDL-c 165 (H), TSH 1.07

## 2020-11-18 ENCOUNTER — TRANSCRIPTION ENCOUNTER (OUTPATIENT)
Age: 55
End: 2020-11-18

## 2020-11-18 LAB
25(OH)D3 SERPL-MCNC: 36.6 NG/ML
ALBUMIN SERPL ELPH-MCNC: 4.5 G/DL
ALP BLD-CCNC: 60 U/L
ALT SERPL-CCNC: 29 U/L
ANION GAP SERPL CALC-SCNC: 13 MMOL/L
APPEARANCE: CLEAR
AST SERPL-CCNC: 21 U/L
BACTERIA UR CULT: NORMAL
BASOPHILS # BLD AUTO: 0.06 K/UL
BASOPHILS NFR BLD AUTO: 0.8 %
BILIRUB SERPL-MCNC: 0.2 MG/DL
BILIRUBIN URINE: NEGATIVE
BLOOD URINE: ABNORMAL
BUN SERPL-MCNC: 13 MG/DL
C TRACH RRNA SPEC QL NAA+PROBE: NOT DETECTED
CALCIUM SERPL-MCNC: 9.7 MG/DL
CHLORIDE SERPL-SCNC: 102 MMOL/L
CHOLEST SERPL-MCNC: 141 MG/DL
CO2 SERPL-SCNC: 24 MMOL/L
COLOR: YELLOW
CREAT SERPL-MCNC: 0.67 MG/DL
CREAT SPEC-SCNC: 154 MG/DL
EOSINOPHIL # BLD AUTO: 0.1 K/UL
EOSINOPHIL NFR BLD AUTO: 1.3 %
ESTIMATED AVERAGE GLUCOSE: 197 MG/DL
ESTRADIOL SERPL-MCNC: 21 PG/ML
FSH SERPL-MCNC: 3.6 IU/L
GLUCOSE QUALITATIVE U: NEGATIVE
GLUCOSE SERPL-MCNC: 100 MG/DL
HBA1C MFR BLD HPLC: 8.5 %
HCT VFR BLD CALC: 48.9 %
HDLC SERPL-MCNC: 44 MG/DL
HGB BLD-MCNC: 15.5 G/DL
IMM GRANULOCYTES NFR BLD AUTO: 0.4 %
KETONES URINE: NEGATIVE
LDLC SERPL CALC-MCNC: 79 MG/DL
LEUKOCYTE ESTERASE URINE: NEGATIVE
LH SERPL-ACNC: 7.2 IU/L
LYMPHOCYTES # BLD AUTO: 2.89 K/UL
LYMPHOCYTES NFR BLD AUTO: 38.3 %
MAN DIFF?: NORMAL
MCHC RBC-ENTMCNC: 30.9 PG
MCHC RBC-ENTMCNC: 31.7 GM/DL
MCV RBC AUTO: 97.4 FL
MICROALBUMIN 24H UR DL<=1MG/L-MCNC: 2.5 MG/DL
MICROALBUMIN/CREAT 24H UR-RTO: 16 MG/G
MONOCYTES # BLD AUTO: 0.84 K/UL
MONOCYTES NFR BLD AUTO: 11.1 %
N GONORRHOEA RRNA SPEC QL NAA+PROBE: NOT DETECTED
NEUTROPHILS # BLD AUTO: 3.62 K/UL
NEUTROPHILS NFR BLD AUTO: 48.1 %
NITRITE URINE: NEGATIVE
NONHDLC SERPL-MCNC: 98 MG/DL
PH URINE: 5.5
PLATELET # BLD AUTO: 179 K/UL
POTASSIUM SERPL-SCNC: 4.3 MMOL/L
PROLACTIN SERPL-MCNC: 3.8 NG/ML
PROT SERPL-MCNC: 7.2 G/DL
PROTEIN URINE: NEGATIVE
PSA SERPL-MCNC: 0.6 NG/ML
RBC # BLD: 5.02 M/UL
RBC # FLD: 13.4 %
SODIUM SERPL-SCNC: 139 MMOL/L
SOURCE AMPLIFICATION: NORMAL
SPECIFIC GRAVITY URINE: >=1.03
TESTOST SERPL-MCNC: 495 NG/DL
TRIGL SERPL-MCNC: 91 MG/DL
UROBILINOGEN URINE: NORMAL
WBC # FLD AUTO: 7.54 K/UL

## 2020-11-18 RX ORDER — AZITHROMYCIN 250 MG/1
250 TABLET, FILM COATED ORAL
Qty: 1 | Refills: 0 | Status: COMPLETED | COMMUNITY
Start: 2020-09-04 | End: 2020-11-18

## 2020-11-18 RX ORDER — PROMETHAZINE HYDROCHLORIDE 6.25 MG/5ML
6.25 SOLUTION ORAL EVERY 8 HOURS
Qty: 210 | Refills: 0 | Status: COMPLETED | COMMUNITY
Start: 2020-09-04 | End: 2020-11-18

## 2020-11-20 ENCOUNTER — APPOINTMENT (OUTPATIENT)
Dept: GASTROENTEROLOGY | Facility: CLINIC | Age: 55
End: 2020-11-20

## 2021-03-22 ENCOUNTER — APPOINTMENT (OUTPATIENT)
Dept: ENDOCRINOLOGY | Facility: CLINIC | Age: 56
End: 2021-03-22

## 2021-03-22 ENCOUNTER — APPOINTMENT (OUTPATIENT)
Dept: INTERNAL MEDICINE | Facility: CLINIC | Age: 56
End: 2021-03-22
Payer: MEDICAID

## 2021-04-15 ENCOUNTER — NON-APPOINTMENT (OUTPATIENT)
Age: 56
End: 2021-04-15

## 2021-04-15 ENCOUNTER — APPOINTMENT (OUTPATIENT)
Dept: ENDOCRINOLOGY | Facility: CLINIC | Age: 56
End: 2021-04-15
Payer: MEDICAID

## 2021-04-15 ENCOUNTER — APPOINTMENT (OUTPATIENT)
Dept: INTERNAL MEDICINE | Facility: CLINIC | Age: 56
End: 2021-04-15
Payer: MEDICAID

## 2021-04-15 VITALS
WEIGHT: 184 LBS | BODY MASS INDEX: 27.25 KG/M2 | HEART RATE: 84 BPM | DIASTOLIC BLOOD PRESSURE: 81 MMHG | HEIGHT: 69 IN | SYSTOLIC BLOOD PRESSURE: 140 MMHG

## 2021-04-15 VITALS
HEART RATE: 75 BPM | DIASTOLIC BLOOD PRESSURE: 91 MMHG | OXYGEN SATURATION: 98 % | BODY MASS INDEX: 27.62 KG/M2 | SYSTOLIC BLOOD PRESSURE: 138 MMHG | TEMPERATURE: 98.2 F | WEIGHT: 187 LBS

## 2021-04-15 DIAGNOSIS — R53.83 OTHER FATIGUE: ICD-10-CM

## 2021-04-15 DIAGNOSIS — E55.9 VITAMIN D DEFICIENCY, UNSPECIFIED: ICD-10-CM

## 2021-04-15 PROCEDURE — 99072 ADDL SUPL MATRL&STAF TM PHE: CPT

## 2021-04-15 PROCEDURE — 99214 OFFICE O/P EST MOD 30 MIN: CPT | Mod: 25

## 2021-04-15 PROCEDURE — 99213 OFFICE O/P EST LOW 20 MIN: CPT | Mod: 25

## 2021-04-15 PROCEDURE — 82962 GLUCOSE BLOOD TEST: CPT

## 2021-04-15 RX ORDER — GLIMEPIRIDE 2 MG/1
2 TABLET ORAL DAILY
Qty: 180 | Refills: 2 | Status: COMPLETED | COMMUNITY
Start: 2020-08-11 | End: 2021-04-15

## 2021-04-15 NOTE — HISTORY OF PRESENT ILLNESS
[FreeTextEntry1] : follow up [de-identified] : fatigue\par - T lvls borderline\par - a/w ED and decreased libido\par - h/o normal cardio evaluation this past year\par Androgen Deficiency Aging Male Questionaire\par \par   Y           1. Do you have a decrease in libido?  \par   Y           2. Do you have a lack of energy?\par   Y           3. Do you have a decrease in strength and/or endurance?\par   Y           4. Have you lost height?\par   Y           5. Do you notice a decrease in "enjoyment of life"?\par          N    6. Are you sad and/or grumpy?\par   Y           7. Are your erections less strong?\par   Y           8. Have you noticed a recent deterioration in your ability to play sports?\par   Y           9. Are you falling asleep after dinner?\par   Y           10. Has there been a recent deterioration in your work performance?\par \par \par DM2\par - was see by endo today\par - glimepiride d/c'ed \par \par right index finger injury\par - 1 week ago, accidentally cut small piece of distal tip off at work\par - was evaluated at [facility] and d/c'ed\par - has since healed well.

## 2021-04-23 LAB
25(OH)D3 SERPL-MCNC: 40 NG/ML
ALBUMIN SERPL ELPH-MCNC: 4.6 G/DL
ALP BLD-CCNC: 64 U/L
ALT SERPL-CCNC: 28 U/L
ANION GAP SERPL CALC-SCNC: 11 MMOL/L
AST SERPL-CCNC: 21 U/L
BASOPHILS # BLD AUTO: 0.06 K/UL
BASOPHILS NFR BLD AUTO: 0.8 %
BILIRUB SERPL-MCNC: 0.3 MG/DL
BUN SERPL-MCNC: 15 MG/DL
CALCIUM SERPL-MCNC: 9.8 MG/DL
CHLORIDE SERPL-SCNC: 102 MMOL/L
CHOLEST SERPL-MCNC: 138 MG/DL
CO2 SERPL-SCNC: 25 MMOL/L
CREAT SERPL-MCNC: 0.72 MG/DL
EOSINOPHIL # BLD AUTO: 0.07 K/UL
EOSINOPHIL NFR BLD AUTO: 0.9 %
ESTRADIOL SERPL-MCNC: 8 PG/ML
FSH SERPL-MCNC: 3.8 IU/L
GLUCOSE SERPL-MCNC: 180 MG/DL
HCT VFR BLD CALC: 48.2 %
HDLC SERPL-MCNC: 47 MG/DL
HGB BLD-MCNC: 15.6 G/DL
IMM GRANULOCYTES NFR BLD AUTO: 0.4 %
LDLC SERPL CALC-MCNC: 80 MG/DL
LH SERPL-ACNC: 5.4 IU/L
LYMPHOCYTES # BLD AUTO: 2.31 K/UL
LYMPHOCYTES NFR BLD AUTO: 30.6 %
MAN DIFF?: NORMAL
MCHC RBC-ENTMCNC: 31.8 PG
MCHC RBC-ENTMCNC: 32.4 GM/DL
MCV RBC AUTO: 98.2 FL
MONOCYTES # BLD AUTO: 0.88 K/UL
MONOCYTES NFR BLD AUTO: 11.7 %
NEUTROPHILS # BLD AUTO: 4.19 K/UL
NEUTROPHILS NFR BLD AUTO: 55.6 %
NONHDLC SERPL-MCNC: 92 MG/DL
PLATELET # BLD AUTO: 157 K/UL
POTASSIUM SERPL-SCNC: 4.7 MMOL/L
PROT SERPL-MCNC: 7.4 G/DL
RBC # BLD: 4.91 M/UL
RBC # FLD: 13.7 %
SODIUM SERPL-SCNC: 138 MMOL/L
TESTOST SERPL-MCNC: 361 NG/DL
TRIGL SERPL-MCNC: 60 MG/DL
TSH SERPL-ACNC: 1.03 UIU/ML
VIT B12 SERPL-MCNC: 270 PG/ML
WBC # FLD AUTO: 7.54 K/UL

## 2021-07-16 RX ORDER — ALOGLIPTIN AND METFORMIN HYDROCHLORIDE 12.5; 1 MG/1; MG/1
12.5-1 TABLET, FILM COATED ORAL
Qty: 180 | Refills: 2 | Status: DISCONTINUED | COMMUNITY
Start: 2020-07-20 | End: 2021-07-16

## 2021-07-16 NOTE — HISTORY OF PRESENT ILLNESS
[FreeTextEntry1] : 54 y/o M pt, with Hx of T2DM (dx ~10 years ago) with DM related complications of retinopathy, proteinuria and CAD.\par Other PMHx: Kidney stone. 2x, "Cardiac ischemia" (many years ago) \par FHx: DM(father, 5 siblings), CA, CAD\par Lifestyle: Walks everyday. Skips lunch often. Checks BS sporadically.\par Last Funduscopic visit: 9/2020 \par Last RD visit: long time ago per pt. \par Has never followed with podiatrist. \par \par 04/15/2021 \par Pt presents today with POCT 167, /81 and BMI 27.17 for DM f/u. He is feeling great today. Pt experienced an episode of dizziness a week ago at around 2 PM which lasted for 5 hours. Pt had not eaten his lunch that day and he did not check BS at the time. Denies CP, SOB, confusion or speech problems associated to that dizziness. \par Pt reports of FBS below 150 and 30% of BS readings at 6PM below 60. Of note, he skips lunch often. \par His weight has been stable for past 5 months.  \par \par Current Medications: Glimepiride 2 mg qd, Aloglipton- Metformin 12.5-1000 mg bid, Atorvastatin 20 mg (initiated in 7/2020), Flomax 0.4 mg, Vitamin D 50,000 IU q3w\par \par Labs:\par - 11/17/20: A1c 8.5%, s.creat 0.67, LDL-c 79, Vit D 25-OH 36.6, Prolactin 3.8 (L)\par - 07/16/20: A1c 12.4%\par - 07/15/20: s.creat 0.69, Cholesterol 238, LDL-c 165, , TSH 1.07

## 2021-07-16 NOTE — REVIEW OF SYSTEMS
[Negative] : Heme/Lymph [Recent Weight Gain (___ Lbs)] : no recent weight gain [Recent Weight Loss (___ Lbs)] : no recent weight loss [de-identified] : hypoglycemia

## 2021-07-16 NOTE — ADDENDUM
[FreeTextEntry1] : I, Aliya Sullivan, acted solely as a scribe for Dr. Pardeep Conner on this date. 04/15/2021. \par \par 7/16/2021 AL\par Insurance prefers [id number]. Spoke to daughter and she Ok'd change. Sent to pharmacy.

## 2021-07-16 NOTE — END OF VISIT
[Time Spent: ___ minutes] : I have spent [unfilled] minutes of time on the encounter. [FreeTextEntry3] : All medical record entries made by the Scribe were at my, Dr. Pardeep Conner, direction and personally dictated by me on 04/15/2021. I have reviewed the chart and agree that the record accurately reflects my personal performance of the history, physical exam, assessment and plan. I have also personally directed, reviewed and agreed with the chart.

## 2021-07-16 NOTE — ASSESSMENT
[Hypoglycemia Management] : hypoglycemia management [FreeTextEntry1] : 56 y/o M pt with:\par \par 1. Hx of T2DM, poorly controlled, with DM related complications of retinopathy, proteinuria, and ASCVD:\par At his initial visit, A1c was 12.4% ( July, 2020). Most recent A1c of 8.5% on 11/17/20. \par Pt is experiencing hypoglycemia because of skipping meals. His FBS has improved significantly. \par Recommend pt to continue with Alogliptin- Metformin and hold Glimepiride. \par \par 3. HLD\par Recent LDL-c 79. \par Recommend pt to to reduced saturated and trans fat consumption. Continue with moderate dose statin.  \par \par Return in: 6 months

## 2021-07-23 ENCOUNTER — APPOINTMENT (OUTPATIENT)
Dept: ENDOCRINOLOGY | Facility: CLINIC | Age: 56
End: 2021-07-23
Payer: MEDICAID

## 2021-07-23 VITALS
DIASTOLIC BLOOD PRESSURE: 80 MMHG | BODY MASS INDEX: 26.07 KG/M2 | HEART RATE: 94 BPM | SYSTOLIC BLOOD PRESSURE: 138 MMHG | WEIGHT: 176 LBS | HEIGHT: 69 IN

## 2021-07-23 LAB — GLUCOSE BLDC GLUCOMTR-MCNC: 296

## 2021-07-23 PROCEDURE — 99214 OFFICE O/P EST MOD 30 MIN: CPT | Mod: 25

## 2021-07-23 PROCEDURE — 82962 GLUCOSE BLOOD TEST: CPT

## 2021-07-23 NOTE — PHYSICAL EXAM
[Alert] : alert [Normal Sclera/Conjunctiva] : normal sclera/conjunctiva [Normal Outer Ear/Nose] : the ears and nose were normal in appearance [No Neck Mass] : no neck mass was observed [Clear to Auscultation] : lungs were clear to auscultation bilaterally [Normal Rate] : heart rate was normal [Abdominal Aorta Normal] : the abdominal aorta was normal [Soft] : abdomen soft [Kyphosis] : no kyphosis present [No Rash] : no rash [Foot Ulcers] : no foot ulcers [Normal Reflexes] : deep tendon reflexes were 2+ and symmetric [Oriented x3] : oriented to person, place, and time [de-identified] : Left shoulder, no redness mild tenderness to palpation

## 2021-07-23 NOTE — HISTORY OF PRESENT ILLNESS
[FreeTextEntry1] : 56 y/o M pt, with Hx of T2DM (dx ~10 years ago) with DM related complications of retinopathy, proteinuria and CAD.\par Other PMHx: Kidney stone. 2x, "Cardiac ischemia" (many years ago) \par FHx: DM(father, 5 siblings), CA, CAD\par Lifestyle: Walks everyday. Skips lunch often. Checks BS sporadically.\par Last Funduscopic visit: 9/2020 \par Last RD visit: long time ago per pt. \par Has never followed with podiatrist. \par \par 04/15/2021 \par Pt presents today with POCT 167, /81 and BMI 27.17 for DM f/u. He is feeling great today. Pt experienced an episode of dizziness a week ago at around 2 PM which lasted for 5 hours. Pt had not eaten his lunch that day and he did not check BS at the time. Denies CP, SOB, confusion or speech problems associated to that dizziness. \par Pt reports of FBS below 150 and 30% of BS readings at 6PM below 60. Of note, he skips lunch often\par \par 7/23/2021\par Patient received COVID-19 vaccine yesterday and he is complaining of left shoulder soreness.  Denies fever, headache\par Patient is not taking his medicine for few days because pharmacy did not give refills, as a result his blood sugars is in the mid 200 he denies osmotic diuresis symptoms though.\par \par Patient is state overall he has improved significantly his level of energy is better and glucose readings in the mornings are between 90 and 130 and postprandial at no higher than 160.\par \par Current Medications: Glimepiride 2 mg qd, Aloglipton- Metformin 12.5-1000 mg bid, Atorvastatin 20 mg (initiated in 7/2020), Flomax 0.4 mg, Vitamin D 50,000 IU q3w\par \par Labs:\par - 11/17/20: A1c 8.5%, s.creat 0.67, LDL-c 79, Vit D 25-OH 36.6, Prolactin 3.8 (L)\par - 07/16/20: A1c 12.4%\par - 07/15/20: s.creat 0.69, Cholesterol 238, LDL-c 165, , TSH 1.07

## 2021-07-23 NOTE — REVIEW OF SYSTEMS
[Polyuria] : no polyuria [Acanthosis] : no acanthosis  [Headaches] : no headaches [Tremors] : no tremors [Depression] : no depression [Negative] : Endocrine [FreeTextEntry9] : Left shoulder soreness, with limited range of motion for the past 24 hours

## 2021-07-23 NOTE — ASSESSMENT
[Hypoglycemia Management] : hypoglycemia management [FreeTextEntry1] : 56 y/o M pt with:\par \par 1. Hx of T2DM, poorly controlled, with DM related complications of retinopathy, proteinuria, and ASCVD:\par At his initial visit, A1c was 12.4% ( July, 2020). Most recent A1c of 8.5% on 11/17/20. \par Patient has made significant changes to his lifestyle and diet as a result of his blood sugar and pre and postprandial has significantly improved\par Over the past few days he is not taking his medicine because pharmacy did not refill it.\par Today POCT glucose 296\par I spoke with the pharmacist and patient will go later to  his prescriptions.\par Recommend pt to continue with Alogliptin- Metformin and hold Glimepiride. \par Labs today\par Return in 4 months\par . \par \par 3. HLD\par Recent LDL-c 79. \par Recommend pt to to reduced saturated and trans fat consumption. Continue with moderate dose statin atorvastatin 20 mg.  \par \par Return in: 4 months

## 2021-09-11 LAB
ALBUMIN SERPL ELPH-MCNC: 4.6 G/DL
ALP BLD-CCNC: 76 U/L
ALT SERPL-CCNC: 36 U/L
ANION GAP SERPL CALC-SCNC: 13 MMOL/L
AST SERPL-CCNC: 23 U/L
BILIRUB SERPL-MCNC: 0.3 MG/DL
BUN SERPL-MCNC: 14 MG/DL
CALCIUM SERPL-MCNC: 9.6 MG/DL
CHLORIDE SERPL-SCNC: 98 MMOL/L
CHOLEST SERPL-MCNC: 153 MG/DL
CO2 SERPL-SCNC: 25 MMOL/L
CREAT SERPL-MCNC: 0.77 MG/DL
CREAT SPEC-SCNC: 106 MG/DL
ESTIMATED AVERAGE GLUCOSE: 217 MG/DL
FOLATE SERPL-MCNC: 15.3 NG/ML
GLUCOSE BLDC GLUCOMTR-MCNC: 109
GLUCOSE BLDC GLUCOMTR-MCNC: 167
GLUCOSE BLDC GLUCOMTR-MCNC: 8
GLUCOSE SERPL-MCNC: 294 MG/DL
HBA1C MFR BLD HPLC: 9.2 %
HDLC SERPL-MCNC: 41 MG/DL
LDLC SERPL CALC-MCNC: 99 MG/DL
MICROALBUMIN 24H UR DL<=1MG/L-MCNC: <1.2 MG/DL
MICROALBUMIN/CREAT 24H UR-RTO: NORMAL MG/G
NONHDLC SERPL-MCNC: 113 MG/DL
POTASSIUM SERPL-SCNC: 4.8 MMOL/L
PROT SERPL-MCNC: 7.3 G/DL
SODIUM SERPL-SCNC: 136 MMOL/L
TRIGL SERPL-MCNC: 70 MG/DL
VIT B12 SERPL-MCNC: 321 PG/ML

## 2021-11-16 ENCOUNTER — APPOINTMENT (OUTPATIENT)
Dept: ENDOCRINOLOGY | Facility: CLINIC | Age: 56
End: 2021-11-16

## 2022-04-11 PROBLEM — Z11.59 SCREENING FOR VIRAL DISEASE: Status: RESOLVED | Noted: 2020-07-15 | Resolved: 2022-04-11

## 2022-10-03 ENCOUNTER — NON-APPOINTMENT (OUTPATIENT)
Age: 57
End: 2022-10-03

## 2022-10-27 ENCOUNTER — APPOINTMENT (OUTPATIENT)
Dept: ENDOCRINOLOGY | Facility: CLINIC | Age: 57
End: 2022-10-27

## 2022-10-27 VITALS
WEIGHT: 188 LBS | HEART RATE: 94 BPM | BODY MASS INDEX: 27.85 KG/M2 | DIASTOLIC BLOOD PRESSURE: 94 MMHG | SYSTOLIC BLOOD PRESSURE: 145 MMHG | HEIGHT: 69 IN

## 2022-10-27 DIAGNOSIS — Z87.442 PERSONAL HISTORY OF URINARY CALCULI: ICD-10-CM

## 2022-10-27 PROCEDURE — 36415 COLL VENOUS BLD VENIPUNCTURE: CPT

## 2022-10-27 PROCEDURE — 82962 GLUCOSE BLOOD TEST: CPT

## 2022-10-27 PROCEDURE — 99214 OFFICE O/P EST MOD 30 MIN: CPT | Mod: 25

## 2022-10-28 NOTE — PHYSICAL EXAM
[Alert] : alert [Normal Sclera/Conjunctiva] : normal sclera/conjunctiva [Normal Outer Ear/Nose] : the ears and nose were normal in appearance [No Neck Mass] : no neck mass was observed [Clear to Auscultation] : lungs were clear to auscultation bilaterally [Normal Rate] : heart rate was normal [Abdominal Aorta Normal] : the abdominal aorta was normal [Soft] : abdomen soft [No Rash] : no rash [Normal Reflexes] : deep tendon reflexes were 2+ and symmetric [Oriented x3] : oriented to person, place, and time [Kyphosis] : no kyphosis present [Foot Ulcers] : no foot ulcers [de-identified] : Left shoulder, no redness mild tenderness to palpation

## 2022-10-28 NOTE — HISTORY OF PRESENT ILLNESS
[Finger Stick] : Finger Stick: Yes [FreeTextEntry1] : 56 y/o M pt, with Hx of T2DM (dx ~2010) with DM related complications of retinopathy, proteinuria and CAD.\par Other PMHx: Kidney stone. 2x, "Cardiac ischemia" (many years ago) \par FHx: DM (father, 5 siblings), CA, CAD\par Lifestyle: Walks everyday. Skips lunch often. Checks BS sporadically.\par Vaccination: COVID\par Last Funduscopic visit: 2022\par Last RD visit: long time ago per pt. \par Has never followed with podiatrist. \par \par 10/27/2022\par Pt has POCT 131, /94 and BMI 27.76. He gained 12 lbs in 15 months. Pt lost his insurance, but has one now. \par Today, pt is feeling okay, with c/o feeling weak/dizzy in the summer. \par Saw ophthalmologist a few months ago. Will complete blood test with PCP next week as per pt. \par Denies weight loss, blurry vision, pins and needle sensation, and numbness. \par \par [Medications verified as per pt on 10/27/2022]  \par Current Medications: Glimepiride 2 mg qd (bottle is full of tablets), Metformin 1000 mg bid, Losartan 25 mg, Atorvastatin 20 mg (initiated in 7/2020), Flomax 0.4 mg, Vitamin D 50,000 IU q3w\par

## 2022-10-28 NOTE — DATA REVIEWED
[FreeTextEntry1] : Labs:\par - 11/17/20: A1c 8.5%, s.creat 0.67, LDL-c 79, Vit D 25-OH 36.6, Prolactin 3.8 (L)\par - 07/16/20: A1c 12.4%\par - 07/15/20: s.creat 0.69, Cholesterol 238, LDL-c 165, , TSH 1.07

## 2022-10-28 NOTE — REVIEW OF SYSTEMS
[Recent Weight Gain (___ Lbs)] : recent weight gain: [unfilled] lbs [As Noted in HPI] : as noted in HPI [Dizziness] : dizziness [Negative] : Heme/Lymph [Recent Weight Loss (___ Lbs)] : no recent weight loss [Blurred Vision] : no blurred vision [Acanthosis] : no acanthosis  [Headaches] : no headaches [Tremors] : no tremors [Pain/Numbness of Digits] : no pain/numbness of digits [FreeTextEntry2] : He gained 12 lbs in 15 months. [de-identified] : Denies pins and needle sensation.

## 2022-10-28 NOTE — END OF VISIT
[FreeTextEntry3] : All medical record entries made by the Scribe were at my, Dr. Pardeep Conner, direction and personally dictated by me on 10/27/2022. I have reviewed the chart and agree that the record accurately reflects my personal performance of the history, physical exam, assessment and plan. I have also personally directed, reviewed and agreed with the chart.  [Time Spent: ___ minutes] : I have spent [unfilled] minutes of time on the encounter.

## 2022-10-28 NOTE — ADDENDUM
[FreeTextEntry1] : I Rodgerkely Quezada act soley as a scribe for Dr. Pardeep Conner on this date. 10/27/2022

## 2022-12-06 ENCOUNTER — APPOINTMENT (OUTPATIENT)
Dept: INTERNAL MEDICINE | Facility: CLINIC | Age: 57
End: 2022-12-06

## 2022-12-06 VITALS
BODY MASS INDEX: 27.85 KG/M2 | OXYGEN SATURATION: 97 % | HEART RATE: 77 BPM | TEMPERATURE: 98.5 F | DIASTOLIC BLOOD PRESSURE: 77 MMHG | RESPIRATION RATE: 14 BRPM | WEIGHT: 188 LBS | SYSTOLIC BLOOD PRESSURE: 132 MMHG | HEIGHT: 69 IN

## 2022-12-06 DIAGNOSIS — Z00.00 ENCOUNTER FOR GENERAL ADULT MEDICAL EXAMINATION W/OUT ABNORMAL FINDINGS: ICD-10-CM

## 2022-12-06 DIAGNOSIS — M79.671 PAIN IN RIGHT FOOT: ICD-10-CM

## 2022-12-06 DIAGNOSIS — M79.2 NEURALGIA AND NEURITIS, UNSPECIFIED: ICD-10-CM

## 2022-12-06 DIAGNOSIS — Z23 ENCOUNTER FOR IMMUNIZATION: ICD-10-CM

## 2022-12-06 DIAGNOSIS — N40.1 BENIGN PROSTATIC HYPERPLASIA WITH LOWER URINARY TRACT SYMPMS: ICD-10-CM

## 2022-12-06 DIAGNOSIS — R35.1 BENIGN PROSTATIC HYPERPLASIA WITH LOWER URINARY TRACT SYMPMS: ICD-10-CM

## 2022-12-06 DIAGNOSIS — R51.9 HEADACHE, UNSPECIFIED: ICD-10-CM

## 2022-12-06 PROCEDURE — 90686 IIV4 VACC NO PRSV 0.5 ML IM: CPT

## 2022-12-06 PROCEDURE — G0008: CPT

## 2022-12-06 PROCEDURE — 99213 OFFICE O/P EST LOW 20 MIN: CPT | Mod: 25

## 2022-12-06 PROCEDURE — 99396 PREV VISIT EST AGE 40-64: CPT | Mod: 25

## 2022-12-06 RX ORDER — TAMSULOSIN HYDROCHLORIDE 0.4 MG/1
0.4 CAPSULE ORAL
Qty: 90 | Refills: 3 | Status: ACTIVE | COMMUNITY
Start: 1900-01-01 | End: 1900-01-01

## 2022-12-06 RX ORDER — OMEGA-3/DHA/EPA/FISH OIL 35-113.5MG
1000 TABLET,CHEWABLE ORAL DAILY
Qty: 30 | Refills: 3 | Status: ACTIVE | COMMUNITY
Start: 2021-04-16 | End: 1900-01-01

## 2022-12-06 RX ORDER — LINAGLIPTIN AND METFORMIN HYDROCHLORIDE 2.5; 1 MG/1; MG/1
2.5-1 TABLET, FILM COATED ORAL
Qty: 180 | Refills: 0 | Status: COMPLETED | COMMUNITY
Start: 2021-07-16 | End: 2022-12-06

## 2022-12-06 NOTE — HISTORY OF PRESENT ILLNESS
[FreeTextEntry1] : annual exam [de-identified] : Travel\par - leaving for [place] 12/20 returns in February 2023\par \par head injury while at work\par - tire fell on his head and he fell forward\par - felt dizzy and saw some stars but did not pass out\par - did not go for medical eval at the time.\par - c/o b/l frantal HA, throbbing, non radiating\par - no change in vision\par \par DM2, uncontrolled\par - confusion w/ medications\par - pt shows that he is still on kazano, but endo note only shows metformin\par \par right flank pain\par - h/o renal stone\par - was previously on tamsulosin but stopped\par - c/o continued nocturia 4x/night and urinary frequency\par \par right arm pain\par - radiates down arm to finger tips\par - present for 1 year, worse of the past 2-3 months\par - at times will cry from pain\par - never had eval. \par \par HCM\par - flu vax today\par - needs c scope; was evaluated by GI 2 yrs ago, never followed up ? b/c of pandemic\par - was evaluated by cardio 2 yrs ago, never followed up

## 2022-12-07 ENCOUNTER — TRANSCRIPTION ENCOUNTER (OUTPATIENT)
Age: 57
End: 2022-12-07

## 2022-12-07 LAB — PSA SERPL-MCNC: 0.82 NG/ML

## 2022-12-15 ENCOUNTER — TRANSCRIPTION ENCOUNTER (OUTPATIENT)
Age: 57
End: 2022-12-15

## 2022-12-15 ENCOUNTER — APPOINTMENT (OUTPATIENT)
Dept: MRI IMAGING | Facility: HOSPITAL | Age: 57
End: 2022-12-15

## 2022-12-15 ENCOUNTER — OUTPATIENT (OUTPATIENT)
Dept: OUTPATIENT SERVICES | Facility: HOSPITAL | Age: 57
LOS: 1 days | End: 2022-12-15
Payer: COMMERCIAL

## 2022-12-15 PROCEDURE — 70551 MRI BRAIN STEM W/O DYE: CPT | Mod: 26

## 2022-12-15 PROCEDURE — 70551 MRI BRAIN STEM W/O DYE: CPT

## 2022-12-15 PROCEDURE — 72040 X-RAY EXAM NECK SPINE 2-3 VW: CPT | Mod: 26

## 2022-12-15 PROCEDURE — 72040 X-RAY EXAM NECK SPINE 2-3 VW: CPT

## 2022-12-15 PROCEDURE — 72141 MRI NECK SPINE W/O DYE: CPT

## 2022-12-15 PROCEDURE — 72141 MRI NECK SPINE W/O DYE: CPT | Mod: 26

## 2022-12-19 ENCOUNTER — TRANSCRIPTION ENCOUNTER (OUTPATIENT)
Age: 57
End: 2022-12-19

## 2022-12-19 LAB
ALBUMIN SERPL ELPH-MCNC: 4.1 G/DL
ALP BLD-CCNC: 70 U/L
ALT SERPL-CCNC: 40 U/L
ANION GAP SERPL CALC-SCNC: 14 MMOL/L
AST SERPL-CCNC: 24 U/L
BILIRUB SERPL-MCNC: 0.2 MG/DL
BUN SERPL-MCNC: 19 MG/DL
CALCIUM SERPL-MCNC: 9.5 MG/DL
CHLORIDE SERPL-SCNC: 103 MMOL/L
CHOLEST SERPL-MCNC: 137 MG/DL
CO2 SERPL-SCNC: 22 MMOL/L
CREAT SERPL-MCNC: 0.87 MG/DL
CREAT SPEC-SCNC: 166 MG/DL
EGFR: 99 ML/MIN/1.73M2
ESTIMATED AVERAGE GLUCOSE: 217 MG/DL
FOLATE SERPL-MCNC: 8.8 NG/ML
GLUCOSE BLDC GLUCOMTR-MCNC: 131
GLUCOSE SERPL-MCNC: 155 MG/DL
HBA1C MFR BLD HPLC: 9.2 %
HDLC SERPL-MCNC: 38 MG/DL
LDLC SERPL CALC-MCNC: 55 MG/DL
MICROALBUMIN 24H UR DL<=1MG/L-MCNC: 1.6 MG/DL
MICROALBUMIN/CREAT 24H UR-RTO: 10 MG/G
NONHDLC SERPL-MCNC: 99 MG/DL
POTASSIUM SERPL-SCNC: 4.4 MMOL/L
PROT SERPL-MCNC: 6.9 G/DL
SODIUM SERPL-SCNC: 139 MMOL/L
TRIGL SERPL-MCNC: 222 MG/DL
TSH SERPL-ACNC: 0.72 UIU/ML
VIT B12 SERPL-MCNC: 403 PG/ML

## 2022-12-23 DIAGNOSIS — Q04.8 OTHER SPECIFIED CONGENITAL MALFORMATIONS OF BRAIN: ICD-10-CM

## 2022-12-23 DIAGNOSIS — M50.821 OTHER CERVICAL DISC DISORDERS AT C4-C5 LEVEL: ICD-10-CM

## 2022-12-23 DIAGNOSIS — M50.822 OTHER CERVICAL DISC DISORDERS AT C5-C6 LEVEL: ICD-10-CM

## 2022-12-23 DIAGNOSIS — M47.812 SPONDYLOSIS WITHOUT MYELOPATHY OR RADICULOPATHY, CERVICAL REGION: ICD-10-CM

## 2022-12-23 DIAGNOSIS — R90.82 WHITE MATTER DISEASE, UNSPECIFIED: ICD-10-CM

## 2022-12-23 DIAGNOSIS — G93.5 COMPRESSION OF BRAIN: ICD-10-CM

## 2022-12-23 DIAGNOSIS — R51.9 HEADACHE, UNSPECIFIED: ICD-10-CM

## 2022-12-23 DIAGNOSIS — J34.89 OTHER SPECIFIED DISORDERS OF NOSE AND NASAL SINUSES: ICD-10-CM

## 2022-12-23 DIAGNOSIS — M50.823: ICD-10-CM

## 2023-02-01 ENCOUNTER — APPOINTMENT (OUTPATIENT)
Dept: HEART AND VASCULAR | Facility: CLINIC | Age: 58
End: 2023-02-01
Payer: MEDICAID

## 2023-02-01 VITALS
HEIGHT: 69 IN | WEIGHT: 182 LBS | OXYGEN SATURATION: 95 % | DIASTOLIC BLOOD PRESSURE: 75 MMHG | HEART RATE: 76 BPM | SYSTOLIC BLOOD PRESSURE: 126 MMHG | BODY MASS INDEX: 26.96 KG/M2 | TEMPERATURE: 98.1 F

## 2023-02-01 DIAGNOSIS — R07.9 CHEST PAIN, UNSPECIFIED: ICD-10-CM

## 2023-02-01 PROCEDURE — 99214 OFFICE O/P EST MOD 30 MIN: CPT | Mod: 25

## 2023-02-01 PROCEDURE — 93000 ELECTROCARDIOGRAM COMPLETE: CPT

## 2023-02-01 NOTE — HISTORY OF PRESENT ILLNESS
[FreeTextEntry1] : Patient comes for f/u. Occasional CP, unrelated to exertion, short lasting resolved spontaneously,.\par He states that he had 2 MI in the past, none of them was w/u'ed and there was no hospitalization. \par Mild FANG, no SOB, no palpitations, no leg swelling.

## 2023-02-01 NOTE — DISCUSSION/SUMMARY
[FreeTextEntry1] : EKG: NSR 64/min\par \par \par 1. CAD/CP -plan for CCTA \par 2.  FANG - TTE\par 3. HLD - cont atorvastatin\par 3. DM - cont as per PCP/Endo -cont losartan 25mg po daily\par 4. 1 month\par \par

## 2023-02-01 NOTE — REVIEW OF SYSTEMS
[Dyspnea on exertion] : dyspnea during exertion [Chest Discomfort] : chest discomfort [Fever] : no fever [Blurry Vision] : no blurred vision [Earache] : no earache [SOB] : no shortness of breath [Leg Claudication] : no intermittent leg claudication [Palpitations] : no palpitations [Orthopnea] : no orthopnea [Syncope] : no syncope [Cough] : no cough [Abdominal Pain] : no abdominal pain [Urinary Frequency] : no change in urinary frequency [Joint Pain] : no joint pain [Rash] : no rash [Dizziness] : no dizziness [Confusion] : no confusion was observed [Easy Bleeding] : no tendency for easy bleeding

## 2023-02-01 NOTE — PHYSICAL EXAM
[General Appearance - Well Developed] : well developed [General Appearance - Well Nourished] : well nourished [Normal Conjunctiva] : the conjunctiva exhibited no abnormalities [Normal Oropharynx] : normal oropharynx [Normal Jugular Venous V Waves Present] : normal jugular venous V waves present [Respiration, Rhythm And Depth] : normal respiratory rhythm and effort [Bowel Sounds] : normal bowel sounds [Abdomen Soft] : soft [Abdomen Tenderness] : non-tender [] : no hepato-splenomegaly [Abnormal Walk] : normal gait [Nail Clubbing] : no clubbing of the fingernails [Skin Color & Pigmentation] : normal skin color and pigmentation

## 2023-02-10 ENCOUNTER — APPOINTMENT (OUTPATIENT)
Dept: GASTROENTEROLOGY | Facility: CLINIC | Age: 58
End: 2023-02-10

## 2023-02-13 RX ORDER — POLYETHYLENE GLYCOL 3350 17 G/17G
17 POWDER, FOR SOLUTION ORAL
Qty: 1 | Refills: 0 | Status: ACTIVE | COMMUNITY
Start: 2023-02-13 | End: 1900-01-01

## 2023-02-16 ENCOUNTER — APPOINTMENT (OUTPATIENT)
Age: 58
End: 2023-02-16
Payer: MEDICAID

## 2023-02-16 ENCOUNTER — RESULT REVIEW (OUTPATIENT)
Age: 58
End: 2023-02-16

## 2023-02-16 PROCEDURE — 45380 COLONOSCOPY AND BIOPSY: CPT

## 2023-02-21 ENCOUNTER — APPOINTMENT (OUTPATIENT)
Dept: UROLOGY | Facility: CLINIC | Age: 58
End: 2023-02-21

## 2023-02-21 ENCOUNTER — APPOINTMENT (OUTPATIENT)
Dept: ORTHOPEDIC SURGERY | Facility: CLINIC | Age: 58
End: 2023-02-21

## 2023-03-01 ENCOUNTER — APPOINTMENT (OUTPATIENT)
Dept: HEART AND VASCULAR | Facility: CLINIC | Age: 58
End: 2023-03-01

## 2023-03-13 ENCOUNTER — TRANSCRIPTION ENCOUNTER (OUTPATIENT)
Age: 58
End: 2023-03-13

## 2023-03-13 RX ORDER — ZOLPIDEM TARTRATE 10 MG/1
10 TABLET ORAL
Qty: 30 | Refills: 3 | Status: DISCONTINUED | COMMUNITY
Start: 2020-07-15 | End: 2023-03-13

## 2023-03-15 ENCOUNTER — APPOINTMENT (OUTPATIENT)
Dept: INTERNAL MEDICINE | Facility: CLINIC | Age: 58
End: 2023-03-15
Payer: MEDICAID

## 2023-03-15 ENCOUNTER — LABORATORY RESULT (OUTPATIENT)
Age: 58
End: 2023-03-15

## 2023-03-15 VITALS
WEIGHT: 183 LBS | TEMPERATURE: 98.3 F | OXYGEN SATURATION: 96 % | DIASTOLIC BLOOD PRESSURE: 74 MMHG | BODY MASS INDEX: 27.02 KG/M2 | SYSTOLIC BLOOD PRESSURE: 118 MMHG | HEART RATE: 85 BPM

## 2023-03-15 DIAGNOSIS — E53.8 DEFICIENCY OF OTHER SPECIFIED B GROUP VITAMINS: ICD-10-CM

## 2023-03-15 DIAGNOSIS — G47.00 INSOMNIA, UNSPECIFIED: ICD-10-CM

## 2023-03-15 DIAGNOSIS — F32.9 MAJOR DEPRESSIVE DISORDER, SINGLE EPISODE, UNSPECIFIED: ICD-10-CM

## 2023-03-15 DIAGNOSIS — E11.69 TYPE 2 DIABETES MELLITUS WITH OTHER SPECIFIED COMPLICATION: ICD-10-CM

## 2023-03-15 DIAGNOSIS — E78.5 TYPE 2 DIABETES MELLITUS WITH OTHER SPECIFIED COMPLICATION: ICD-10-CM

## 2023-03-15 DIAGNOSIS — M54.50 LOW BACK PAIN, UNSPECIFIED: ICD-10-CM

## 2023-03-15 PROCEDURE — 99214 OFFICE O/P EST MOD 30 MIN: CPT | Mod: 25

## 2023-03-15 RX ORDER — TRAZODONE HYDROCHLORIDE 50 MG/1
50 TABLET ORAL
Qty: 30 | Refills: 3 | Status: COMPLETED | COMMUNITY
Start: 2023-03-13 | End: 2023-03-15

## 2023-03-15 NOTE — HISTORY OF PRESENT ILLNESS
[de-identified] : \par Depressed mood x 1 month:\par - has felt this way in the past\par - trouble sleeping, easting, poor energy, depressed mood\par - 14 years in his home and recently forced to live in basement \par - + suicidal thoughts, no plan or intent. Started occurring 3 months ago\par - has appointment with psychiatrist\par \par LL back pain:\par - started this morning,  pain is currently, 5/10 in severity non radiating\par - did not try any medication\par - No change in bowel movements, urination, change in gait, paresthesia\par \par DM:\par - uncontrolled\par - will check today

## 2023-03-15 NOTE — PHYSICAL EXAM
[No Edema] : there was no peripheral edema [Normal] : soft, non-tender, non-distended, no masses palpated, no HSM and normal bowel sounds [No Spinal Tenderness] : no spinal tenderness [2+] : left 2+ [Depressed] : depressed [Suicidal Ideation] : admitted to suicidal ideation [Racing Thoughts] : no racing thoughts [Loose Associations] : no loosening of associations [Circumferential] : no circumferentiality [Tangentiality] : no tangentiality [Delusions] : exhibited no delusions [Hallucinations] : exhibited no hallucinations [Suicidal Intent] : denied suicidal intent [Suicidal Plan] : denied suicidal plans [Homicidal Ideation] : denied homicidal ideation [Homicidal Intent] : denied homicidal intention [Homicidal Plan] : denied homicidal plans [de-identified] : tearful

## 2023-03-16 ENCOUNTER — TRANSCRIPTION ENCOUNTER (OUTPATIENT)
Age: 58
End: 2023-03-16

## 2023-03-16 LAB
ALBUMIN SERPL ELPH-MCNC: 4.5 G/DL
ALP BLD-CCNC: 74 U/L
ALT SERPL-CCNC: 32 U/L
ANION GAP SERPL CALC-SCNC: 15 MMOL/L
APPEARANCE: CLEAR
AST SERPL-CCNC: 19 U/L
BASOPHILS # BLD AUTO: 0.06 K/UL
BASOPHILS NFR BLD AUTO: 0.7 %
BILIRUB SERPL-MCNC: 0.4 MG/DL
BILIRUBIN URINE: NEGATIVE
BLOOD URINE: ABNORMAL
BUN SERPL-MCNC: 15 MG/DL
CALCIUM SERPL-MCNC: 10 MG/DL
CHLORIDE SERPL-SCNC: 99 MMOL/L
CHOLEST SERPL-MCNC: 178 MG/DL
CO2 SERPL-SCNC: 24 MMOL/L
COLOR: YELLOW
CREAT SERPL-MCNC: 0.76 MG/DL
EGFR: 104 ML/MIN/1.73M2
EOSINOPHIL # BLD AUTO: 0.04 K/UL
EOSINOPHIL NFR BLD AUTO: 0.5 %
ESTIMATED AVERAGE GLUCOSE: 235 MG/DL
GLUCOSE QUALITATIVE U: ABNORMAL
GLUCOSE SERPL-MCNC: 307 MG/DL
HBA1C MFR BLD HPLC: 9.8 %
HCT VFR BLD CALC: 49 %
HDLC SERPL-MCNC: 53 MG/DL
HGB BLD-MCNC: 16 G/DL
IMM GRANULOCYTES NFR BLD AUTO: 0.1 %
KETONES URINE: NORMAL
LDLC SERPL CALC-MCNC: 108 MG/DL
LEUKOCYTE ESTERASE URINE: NEGATIVE
LYMPHOCYTES # BLD AUTO: 2.68 K/UL
LYMPHOCYTES NFR BLD AUTO: 32 %
MAN DIFF?: NORMAL
MCHC RBC-ENTMCNC: 31.9 PG
MCHC RBC-ENTMCNC: 32.7 GM/DL
MCV RBC AUTO: 97.8 FL
MONOCYTES # BLD AUTO: 0.71 K/UL
MONOCYTES NFR BLD AUTO: 8.5 %
NEUTROPHILS # BLD AUTO: 4.87 K/UL
NEUTROPHILS NFR BLD AUTO: 58.2 %
NITRITE URINE: NEGATIVE
NONHDLC SERPL-MCNC: 124 MG/DL
PH URINE: 5.5
PLATELET # BLD AUTO: 162 K/UL
POTASSIUM SERPL-SCNC: 4.4 MMOL/L
PROT SERPL-MCNC: 7.3 G/DL
PROTEIN URINE: NORMAL
RBC # BLD: 5.01 M/UL
RBC # FLD: 13.1 %
SODIUM SERPL-SCNC: 138 MMOL/L
SPECIFIC GRAVITY URINE: 1.03
TRIGL SERPL-MCNC: 82 MG/DL
UROBILINOGEN URINE: NORMAL
VIT B12 SERPL-MCNC: 569 PG/ML
WBC # FLD AUTO: 8.37 K/UL

## 2023-03-17 ENCOUNTER — TRANSCRIPTION ENCOUNTER (OUTPATIENT)
Age: 58
End: 2023-03-17

## 2023-03-17 ENCOUNTER — NON-APPOINTMENT (OUTPATIENT)
Age: 58
End: 2023-03-17

## 2023-03-17 RX ORDER — ESCITALOPRAM OXALATE 10 MG/1
10 TABLET ORAL
Qty: 30 | Refills: 2 | Status: ACTIVE | COMMUNITY
Start: 2023-03-17 | End: 1900-01-01

## 2023-03-17 RX ORDER — TRAZODONE HYDROCHLORIDE 150 MG/1
150 TABLET ORAL
Qty: 30 | Refills: 3 | Status: DISCONTINUED | COMMUNITY
Start: 2023-03-15 | End: 2023-03-17

## 2023-04-20 ENCOUNTER — TRANSCRIPTION ENCOUNTER (OUTPATIENT)
Age: 58
End: 2023-04-20

## 2023-04-21 ENCOUNTER — APPOINTMENT (OUTPATIENT)
Dept: UROLOGY | Facility: CLINIC | Age: 58
End: 2023-04-21
Payer: MEDICAID

## 2023-04-21 VITALS
HEART RATE: 80 BPM | DIASTOLIC BLOOD PRESSURE: 78 MMHG | TEMPERATURE: 97.7 F | WEIGHT: 183 LBS | HEIGHT: 69 IN | BODY MASS INDEX: 27.11 KG/M2 | SYSTOLIC BLOOD PRESSURE: 117 MMHG

## 2023-04-21 PROCEDURE — 99204 OFFICE O/P NEW MOD 45 MIN: CPT

## 2023-04-21 NOTE — ASSESSMENT
[FreeTextEntry1] : Microscopic hematuria\par UA UCx Cytology.\par Sonogram\par Cysto\par \par BPH\par Continue flomax 0.4.\par

## 2023-04-21 NOTE — HISTORY OF PRESENT ILLNESS
[FreeTextEntry1] : 58 yo. DM2, BPH. H/o kidney stones treated w surgery more than 10 years ago. \par Referred by Dr Clayton.\par \par Reports microscopic hematuria.\par Reports back pain. \par No fever. \par Burning and pain with urination.\par \par Currently on tamsulosin 0.4\par Improved FOS.\par No nocturia. \par No gross hematuria. \par Non smoker. \par \par No ED. Retrograde ejaculation reported. \par \par RBC 7/HPF\par HbA1c 9.8%\par Last PSA 12/2022 0.82\par Reports EILEEN previously done by other physician normal.\par \par No family hx of prostate cancer.

## 2023-04-24 LAB
APPEARANCE: CLEAR
BACTERIA UR CULT: NORMAL
BACTERIA: NEGATIVE
BILIRUBIN URINE: NEGATIVE
BLOOD URINE: ABNORMAL
COLOR: NORMAL
GLUCOSE QUALITATIVE U: ABNORMAL
HYALINE CASTS: 0 /LPF
KETONES URINE: NEGATIVE
LEUKOCYTE ESTERASE URINE: NEGATIVE
MICROSCOPIC-UA: NORMAL
NITRITE URINE: POSITIVE
PH URINE: 6
PROTEIN URINE: NEGATIVE
RED BLOOD CELLS URINE: 4 /HPF
SPECIFIC GRAVITY URINE: 1.02
SQUAMOUS EPITHELIAL CELLS: 0 /HPF
UROBILINOGEN URINE: NORMAL
WHITE BLOOD CELLS URINE: 7 /HPF

## 2023-05-01 LAB — URINE CYTOLOGY: NORMAL

## 2023-05-19 ENCOUNTER — APPOINTMENT (OUTPATIENT)
Dept: UROLOGY | Facility: CLINIC | Age: 58
End: 2023-05-19
Payer: MEDICAID

## 2023-05-19 VITALS — SYSTOLIC BLOOD PRESSURE: 123 MMHG | HEART RATE: 76 BPM | DIASTOLIC BLOOD PRESSURE: 85 MMHG | TEMPERATURE: 97.9 F

## 2023-05-19 DIAGNOSIS — R31.29 OTHER MICROSCOPIC HEMATURIA: ICD-10-CM

## 2023-05-19 PROCEDURE — 52000 CYSTOURETHROSCOPY: CPT

## 2023-05-19 PROCEDURE — 76775 US EXAM ABDO BACK WALL LIM: CPT

## 2023-06-14 ENCOUNTER — APPOINTMENT (OUTPATIENT)
Dept: ENDOCRINOLOGY | Facility: CLINIC | Age: 58
End: 2023-06-14

## 2023-06-15 ENCOUNTER — APPOINTMENT (OUTPATIENT)
Dept: ENDOCRINOLOGY | Facility: CLINIC | Age: 58
End: 2023-06-15
Payer: MEDICAID

## 2023-06-15 VITALS
WEIGHT: 177 LBS | HEART RATE: 80 BPM | BODY MASS INDEX: 26.14 KG/M2 | SYSTOLIC BLOOD PRESSURE: 118 MMHG | DIASTOLIC BLOOD PRESSURE: 69 MMHG

## 2023-06-15 PROCEDURE — 82962 GLUCOSE BLOOD TEST: CPT

## 2023-06-15 PROCEDURE — 99214 OFFICE O/P EST MOD 30 MIN: CPT | Mod: 25

## 2023-06-15 PROCEDURE — 83036 HEMOGLOBIN GLYCOSYLATED A1C: CPT | Mod: QW

## 2023-06-15 RX ORDER — ALOGLIPTIN AND METFORMIN HYDROCHLORIDE 12.5; 1 MG/1; MG/1
12.5-1 TABLET, FILM COATED ORAL TWICE DAILY
Qty: 180 | Refills: 0 | Status: DISCONTINUED | COMMUNITY
Start: 2022-12-06 | End: 2023-06-15

## 2023-06-15 RX ORDER — LOSARTAN POTASSIUM 25 MG/1
25 TABLET, FILM COATED ORAL DAILY
Qty: 90 | Refills: 3 | Status: DISCONTINUED | COMMUNITY
Start: 2020-07-15 | End: 2023-06-15

## 2023-06-16 NOTE — ADDENDUM
[FreeTextEntry1] : I, Teresa Reyna act soley as a scribe for Dr. Pardeep Conner on this date. 06/15/2023

## 2023-06-16 NOTE — END OF VISIT
[FreeTextEntry3] : All medical record entries made by the Scribe were at my, Dr. Pardeep Conner, direction and personally dictated by me on 06/15/2023. I have reviewed the chart and agree that the record accurately reflects my personal performance of the history, physical exam, assessment and plan. I have also personally directed, reviewed and agreed with the chart.  [Time Spent: ___ minutes] : I have spent [unfilled] minutes of time on the encounter.

## 2023-06-16 NOTE — PHYSICAL EXAM
[Alert] : alert [Normal Sclera/Conjunctiva] : normal sclera/conjunctiva [Normal Outer Ear/Nose] : the ears and nose were normal in appearance [No Neck Mass] : no neck mass was observed [Clear to Auscultation] : lungs were clear to auscultation bilaterally [Normal Rate] : heart rate was normal [Abdominal Aorta Normal] : the abdominal aorta was normal [Soft] : abdomen soft [No Rash] : no rash [Normal Reflexes] : deep tendon reflexes were 2+ and symmetric [Oriented x3] : oriented to person, place, and time [Kyphosis] : no kyphosis present [Foot Ulcers] : no foot ulcers [de-identified] : Left shoulder, no redness mild tenderness to palpation

## 2023-06-16 NOTE — REVIEW OF SYSTEMS
[Recent Weight Gain (___ Lbs)] : recent weight gain: [unfilled] lbs [As Noted in HPI] : as noted in HPI [Dizziness] : dizziness [Negative] : Heme/Lymph [Recent Weight Loss (___ Lbs)] : no recent weight loss [Blurred Vision] : no blurred vision [Acanthosis] : no acanthosis  [Headaches] : no headaches [Tremors] : no tremors [Pain/Numbness of Digits] : no pain/numbness of digits [FreeTextEntry2] : He gained 12 lbs in 15 months. [de-identified] : Denies pins and needle sensation.
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
## 2023-07-27 ENCOUNTER — TRANSCRIPTION ENCOUNTER (OUTPATIENT)
Age: 58
End: 2023-07-27

## 2023-07-27 LAB
ANION GAP SERPL CALC-SCNC: 14 MMOL/L
BUN SERPL-MCNC: 15 MG/DL
CALCIUM SERPL-MCNC: 9.9 MG/DL
CHLORIDE SERPL-SCNC: 102 MMOL/L
CHOLEST SERPL-MCNC: 141 MG/DL
CO2 SERPL-SCNC: 25 MMOL/L
CREAT SERPL-MCNC: 0.83 MG/DL
CREAT SPEC-SCNC: 109 MG/DL
EGFR: 101 ML/MIN/1.73M2
ESTIMATED AVERAGE GLUCOSE: 235 MG/DL
GLUCOSE BLDC GLUCOMTR-MCNC: 166
GLUCOSE SERPL-MCNC: 107 MG/DL
HBA1C MFR BLD HPLC: 9.1
HBA1C MFR BLD HPLC: 9.8 %
HDLC SERPL-MCNC: 51 MG/DL
LDLC SERPL CALC-MCNC: 76 MG/DL
MICROALBUMIN 24H UR DL<=1MG/L-MCNC: <1.2 MG/DL
MICROALBUMIN/CREAT 24H UR-RTO: NORMAL MG/G
NONHDLC SERPL-MCNC: 90 MG/DL
POTASSIUM SERPL-SCNC: 5.3 MMOL/L
SODIUM SERPL-SCNC: 140 MMOL/L
TRIGL SERPL-MCNC: 70 MG/DL

## 2023-09-18 ENCOUNTER — TRANSCRIPTION ENCOUNTER (OUTPATIENT)
Age: 58
End: 2023-09-18

## 2023-10-05 ENCOUNTER — NON-APPOINTMENT (OUTPATIENT)
Age: 58
End: 2023-10-05

## 2023-10-06 ENCOUNTER — APPOINTMENT (OUTPATIENT)
Dept: UROLOGY | Facility: CLINIC | Age: 58
End: 2023-10-06

## 2024-01-20 ENCOUNTER — TRANSCRIPTION ENCOUNTER (OUTPATIENT)
Age: 59
End: 2024-01-20

## 2024-01-20 DIAGNOSIS — E11.65 TYPE 2 DIABETES MELLITUS WITH HYPERGLYCEMIA: ICD-10-CM

## 2024-01-20 RX ORDER — GLIMEPIRIDE 4 MG/1
4 TABLET ORAL DAILY
Qty: 30 | Refills: 1 | Status: ACTIVE | COMMUNITY
Start: 2021-09-11 | End: 1900-01-01

## 2024-01-20 RX ORDER — LANCETS 33 GAUGE
EACH MISCELLANEOUS
Qty: 100 | Refills: 1 | Status: ACTIVE | COMMUNITY
Start: 2020-07-24 | End: 1900-01-01

## 2024-01-20 RX ORDER — BLOOD SUGAR DIAGNOSTIC
STRIP MISCELLANEOUS 3 TIMES DAILY
Qty: 3 | Refills: 0 | Status: ACTIVE | COMMUNITY
Start: 2020-07-24 | End: 1900-01-01

## 2024-01-20 RX ORDER — ISOPROPYL ALCOHOL 0.7 ML/ML
SWAB TOPICAL
Qty: 1 | Refills: 3 | Status: ACTIVE | COMMUNITY
Start: 2020-07-24

## 2024-01-20 RX ORDER — BLOOD-GLUCOSE METER
W/DEVICE EACH MISCELLANEOUS
Qty: 1 | Refills: 0 | Status: ACTIVE | COMMUNITY
Start: 2023-06-15 | End: 1900-01-01

## 2024-01-20 RX ORDER — METFORMIN HYDROCHLORIDE 1000 MG/1
1000 TABLET, COATED ORAL
Qty: 180 | Refills: 0 | Status: ACTIVE | COMMUNITY
Start: 2022-10-03 | End: 1900-01-01

## 2024-01-22 ENCOUNTER — TRANSCRIPTION ENCOUNTER (OUTPATIENT)
Age: 59
End: 2024-01-22

## 2024-01-22 RX ORDER — ATORVASTATIN CALCIUM 20 MG/1
20 TABLET, FILM COATED ORAL
Qty: 90 | Refills: 0 | Status: ACTIVE | COMMUNITY
Start: 2020-07-17 | End: 1900-01-01